# Patient Record
Sex: FEMALE | Race: WHITE
[De-identification: names, ages, dates, MRNs, and addresses within clinical notes are randomized per-mention and may not be internally consistent; named-entity substitution may affect disease eponyms.]

---

## 2019-12-19 ENCOUNTER — HOSPITAL ENCOUNTER (INPATIENT)
Dept: HOSPITAL 76 - ED | Age: 59
LOS: 5 days | Discharge: HOME | DRG: 392 | End: 2019-12-24
Attending: INTERNAL MEDICINE | Admitting: SPECIALIST
Payer: COMMERCIAL

## 2019-12-19 DIAGNOSIS — T36.95XA: ICD-10-CM

## 2019-12-19 DIAGNOSIS — K56.7: ICD-10-CM

## 2019-12-19 DIAGNOSIS — R11.2: ICD-10-CM

## 2019-12-19 DIAGNOSIS — I10: ICD-10-CM

## 2019-12-19 DIAGNOSIS — E66.9: ICD-10-CM

## 2019-12-19 DIAGNOSIS — E87.6: ICD-10-CM
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
## 2019-12-19 DIAGNOSIS — Z87.891: ICD-10-CM

## 2019-12-19 DIAGNOSIS — K57.20: Primary | ICD-10-CM

## 2019-12-19 LAB
ALBUMIN DIAFP-MCNC: 4 G/DL (ref 3.2–5.5)
ALBUMIN/GLOB SERPL: 1.3 {RATIO} (ref 1–2.2)
ALP SERPL-CCNC: 91 IU/L (ref 42–121)
ALT SERPL W P-5'-P-CCNC: 18 IU/L (ref 10–60)
ANION GAP SERPL CALCULATED.4IONS-SCNC: 9 MMOL/L (ref 6–13)
AST SERPL W P-5'-P-CCNC: 22 IU/L (ref 10–42)
BASOPHILS NFR BLD AUTO: 0 10^3/UL (ref 0–0.1)
BASOPHILS NFR BLD AUTO: 0.4 %
BILIRUB BLD-MCNC: 1.3 MG/DL (ref 0.2–1)
BUN SERPL-MCNC: 12 MG/DL (ref 6–20)
CALCIUM UR-MCNC: 9.1 MG/DL (ref 8.5–10.3)
CHLORIDE SERPL-SCNC: 102 MMOL/L (ref 101–111)
CLARITY UR REFRACT.AUTO: CLEAR
CO2 SERPL-SCNC: 28 MMOL/L (ref 21–32)
CREAT SERPLBLD-SCNC: 0.8 MG/DL (ref 0.4–1)
EOSINOPHIL # BLD AUTO: 0.1 10^3/UL (ref 0–0.7)
EOSINOPHIL NFR BLD AUTO: 1 %
ERYTHROCYTE [DISTWIDTH] IN BLOOD BY AUTOMATED COUNT: 13.5 % (ref 12–15)
GFRSERPLBLD MDRD-ARVRAT: 73 ML/MIN/{1.73_M2} (ref 89–?)
GLOBULIN SER-MCNC: 3 G/DL (ref 2.1–4.2)
GLUCOSE SERPL-MCNC: 107 MG/DL (ref 70–100)
GLUCOSE UR QL STRIP.AUTO: NEGATIVE MG/DL
HGB UR QL STRIP: 12.9 G/DL (ref 12–16)
KETONES UR QL STRIP.AUTO: NEGATIVE MG/DL
LIPASE SERPL-CCNC: 25 U/L (ref 22–51)
LYMPHOCYTES # SPEC AUTO: 0.9 10^3/UL (ref 1.5–3.5)
LYMPHOCYTES NFR BLD AUTO: 7.8 %
MCH RBC QN AUTO: 28.3 PG (ref 27–31)
MCHC RBC AUTO-ENTMCNC: 31.9 G/DL (ref 32–36)
MCV RBC AUTO: 88.8 FL (ref 81–99)
MONOCYTES # BLD AUTO: 0.5 10^3/UL (ref 0–1)
MONOCYTES NFR BLD AUTO: 4.8 %
NEUTROPHILS # BLD AUTO: 9.5 10^3/UL (ref 1.5–6.6)
NEUTROPHILS # SNV AUTO: 11.1 X10^3/UL (ref 4.8–10.8)
NEUTROPHILS NFR BLD AUTO: 85.4 %
NITRITE UR QL STRIP.AUTO: NEGATIVE
PDW BLD AUTO: 10.9 FL (ref 7.9–10.8)
PH UR STRIP.AUTO: 6 PH (ref 5–7.5)
PLATELET # BLD: 190 10^3/UL (ref 130–450)
PROT SPEC-MCNC: 7 G/DL (ref 6.7–8.2)
PROT UR STRIP.AUTO-MCNC: NEGATIVE MG/DL
RBC # UR STRIP.AUTO: (no result) /UL
RBC MAR: 4.56 10^6/UL (ref 4.2–5.4)
SODIUM SERPLBLD-SCNC: 139 MMOL/L (ref 135–145)
SP GR UR STRIP.AUTO: 1.01 (ref 1–1.03)
UROBILINOGEN UR QL STRIP.AUTO: (no result) E.U./DL
UROBILINOGEN UR STRIP.AUTO-MCNC: NEGATIVE MG/DL

## 2019-12-19 PROCEDURE — 36415 COLL VENOUS BLD VENIPUNCTURE: CPT

## 2019-12-19 PROCEDURE — 87086 URINE CULTURE/COLONY COUNT: CPT

## 2019-12-19 PROCEDURE — 81003 URINALYSIS AUTO W/O SCOPE: CPT

## 2019-12-19 PROCEDURE — 80053 COMPREHEN METABOLIC PANEL: CPT

## 2019-12-19 PROCEDURE — 96374 THER/PROPH/DIAG INJ IV PUSH: CPT

## 2019-12-19 PROCEDURE — 99285 EMERGENCY DEPT VISIT HI MDM: CPT

## 2019-12-19 PROCEDURE — 83690 ASSAY OF LIPASE: CPT

## 2019-12-19 PROCEDURE — 96361 HYDRATE IV INFUSION ADD-ON: CPT

## 2019-12-19 PROCEDURE — 81001 URINALYSIS AUTO W/SCOPE: CPT

## 2019-12-19 PROCEDURE — 74177 CT ABD & PELVIS W/CONTRAST: CPT

## 2019-12-19 PROCEDURE — 99284 EMERGENCY DEPT VISIT MOD MDM: CPT

## 2019-12-19 PROCEDURE — 80048 BASIC METABOLIC PNL TOTAL CA: CPT

## 2019-12-19 PROCEDURE — 85025 COMPLETE CBC W/AUTO DIFF WBC: CPT

## 2019-12-19 RX ADMIN — ACETAMINOPHEN PRN MG: 325 TABLET ORAL at 19:08

## 2019-12-19 RX ADMIN — SODIUM CHLORIDE SCH MLS/HR: 9 INJECTION, SOLUTION INTRAVENOUS at 19:09

## 2019-12-19 NOTE — ED PHYSICIAN DOCUMENTATION
PD HPI ABD PAIN





- Stated complaint


Stated Complaint: ABD PX





- Chief complaint


Chief Complaint: Abd Pain





- History obtained from


History obtained from: Patient





- History of Present Illness


Timing - onset: How many days ago (1-2)


Timing - duration: Days (1-2)


Timing - details: Gradual onset, Still present


Quality: Cramping (initially), Aching, Sharp (today), Pain


Location: LLQ


Radiation: Lower back


Improved by: Laying still.  No: Eating


Worsened by: Moving.  No: Eating, Breathing


Associated symptoms: No: Fever, Nausea, Vomiting, Diarrhea, Melena


Similar symptoms before: Diagnosis (Had diverticulitis with some bleeding in 

2015 and had clipping of the bleeding vessel.  I believe this was done through 

colonoscopy.)


Recently seen: Not recently seen





Review of Systems


Constitutional: denies: Fever, Myalgias


Nose: denies: Rhinorrhea / runny nose, Congestion


Throat: denies: Sore throat


Respiratory: denies: Cough


GI: reports: Abdominal Pain.  denies: Abdominal Swelling, Vomiting, 

Constipation, Diarrhea


: denies: Dysuria, Frequency


Neurologic: denies: Generalized weakness, Focal weakness, Numbness, Near syncope





PD PAST MEDICAL HISTORY





- Past Medical History


Cardiovascular: None


Respiratory: None


Neuro: None


Endocrine/Autoimmune: None


GI: Diverticulitis (4 years ago)





- Allergies


Allergies/Adverse Reactions: 


                                    Allergies











Allergy/AdvReac Type Severity Reaction Status Date / Time


 


iodine Allergy  Unknown Verified 12/19/19 18:21














PD ED PE NORMAL





- Vitals


Vital signs reviewed: Yes





- General


General: Alert and oriented X 3, Well developed/nourished





- HEENT


HEENT: Moist mucous membranes, Pharynx benign





- Neck


Neck: Supple, no meningeal sign, No adenopathy





- Cardiac


Cardiac: RRR, No murmur





- Respiratory


Respiratory: Clear bilaterally





- Abdomen


Abdomen: Soft, Non distended, No organomegaly, Other (tender LLQ with local 

guarding but no percussion. No rebound nor referred tenderness. )





- Female 


Female : Deferred





- Rectal


Rectal: Deferred





- Back


Back: No CVA TTP





- Derm


Derm: Normal color, Warm and dry





- Extremities


Extremities: Normal ROM s pain





- Neuro


Neuro: Alert and oriented X 3, No motor deficit, Normal speech





Results





- Vitals


Vitals: 


                               Vital Signs - 24 hr











  12/19/19





  13:25


 


Temperature 36.5 C


 


Heart Rate 85


 


Respiratory 16





Rate 


 


Blood Pressure 185/95 H


 


O2 Saturation 98








                                     Oxygen











O2 Source                      Room air

















- Labs


Labs: 


                                Laboratory Tests











  12/19/19 12/19/19 12/19/19





  13:47 13:47 16:00


 


WBC  11.1 H  


 


RBC  4.56  


 


Hgb  12.9  


 


Hct  40.5  


 


MCV  88.8  


 


MCH  28.3  


 


MCHC  31.9 L  


 


RDW  13.5  


 


Plt Count  190  


 


MPV  10.9 H  


 


Neut # (Auto)  9.5 H  


 


Lymph # (Auto)  0.9 L  


 


Mono # (Auto)  0.5  


 


Eos # (Auto)  0.1  


 


Baso # (Auto)  0.0  


 


Absolute Nucleated RBC  0.00  


 


Nucleated RBC %  0.0  


 


Sodium   139 


 


Potassium   3.9 


 


Chloride   102 


 


Carbon Dioxide   28 


 


Anion Gap   9.0 


 


BUN   12 


 


Creatinine   0.8 


 


Estimated GFR (MDRD)   73 L 


 


Glucose   107 H 


 


Calcium   9.1 


 


Total Bilirubin   1.3 H 


 


AST   22 


 


ALT   18 


 


Alkaline Phosphatase   91 


 


Total Protein   7.0 


 


Albumin   4.0 


 


Globulin   3.0 


 


Albumin/Globulin Ratio   1.3 


 


Lipase   25 


 


Urine Color    YELLOW


 


Urine Clarity    CLEAR


 


Urine pH    6.0


 


Ur Specific Gravity    1.010


 


Urine Protein    NEGATIVE


 


Urine Glucose (UA)    NEGATIVE


 


Urine Ketones    NEGATIVE


 


Urine Occult Blood    TRACE-LYSE


 


Urine Nitrite    NEGATIVE


 


Urine Bilirubin    NEGATIVE


 


Urine Urobilinogen    0.2 (NORMAL)


 


Ur Leukocyte Esterase    NEGATIVE


 


Ur Microscopic Review    NOT INDICATED


 


Urine Culture Comments    NOT INDICATED














- Rads (name of study)


  ** abd CT


Radiology: Prelim report reviewed, Discussed with rads (Left lower quadrant 

diverticula with diverticulitis and several small air bubbles under the 

diaphragm.  No abscess nor notable free fluid), See rad report





PD MEDICAL DECISION MAKING





- ED course


Complexity details: reviewed results, considered differential, d/w patient, d/w 

consultant (Dr. Perry, surgery, who was concerned about the microperforation,

and wanted Unasyn/Flagyl and admission to Hospitalist. He will consult and see 

if patient develops need for surgery. Talked with Hospitalist who will place 

patient in the hospital. )





Departure





- Departure


Disposition: [facility] DC/Xfer


Clinical Impression: 


 Acute diverticulitis





Condition: Stable


Record reviewed to determine appropriate education?: Yes


Discharge Date/Time: 12/19/19 18:51

## 2019-12-19 NOTE — HISTORY & PHYSICAL EXAMINATION
Chief Complaint





- Chief Complaint


Chief Complaint: abdominal pain





History of Present Illness





- History of Present Illness


HPI Comment/Other: 


Ms. Vargas is a 59-yrs-old female with a PMH significant for diverticulitis 

with bleeding and clipping of the bleeding vessel in , who present ER 

complain of left middle lower abdominal pain. pt report she had left middle 

lower abdominal pain beginning today morning. she denies nausea, vomiting, 

diarrhea, GI bleeding, fever, chill, chest pain, shortness of breath. CT of 

abdomen reveal acute diverticulitis, and small amounts of free air 

intraperitoneal. Route lab reveals slight elevated WBC. pt is afebrile, 

otherwise she is hemodynamical stable. surgeon was called by ER provider. pt is 

admitted for above medical reason.











History





- Family & Social History


Family History: Mother: , Cancer, Father: Alive and Well


Family History Comment/Other: pt report her mother  from pancreatic cancer. 

her father is still alive with advanced parkinson now.


Social History Notes: pt report she quit smoking about 3-4 yrs, she denies 

acohol and drug issue. she report she is living [place], came to visit her 

family in [facility] Status: Full Code





Meds/Allgy





- Allergies


Allergies/Adverse Reactions: 


                                    Allergies











Allergy/AdvReac Type Severity Reaction Status Date / Time


 


etodolac Allergy  Rash Verified 19 21:34














Review of Systems





- Constitutional


Constitutional: denies: Fatigue, Fever, Chills, Malaise, Weakness, Poor 

appetite, Diaphoresis, Night sweats





- Eyes


Eyes: denies: Pain, Irritation, Blurred vision, Spots in vision, Field loss, 

Vision loss, Dipolpia





- Ears, Nose & Throat


Ears, Nose & Throat: denies: Ear pain, Hearing loss, Tinnitus, Vertigo, Nasal 

pain, Nasal discharge, Nasal obstruction, Postnasal drainage, Sore throat, Mouth

lesions





- Cardiovascular


Cariovascular: denies: Irregular heart rate, Palpitations, Chest pain, Edema, 

Lightheadedness, Syncope, Exertional dyspnea, Decr. exercise tolerance





- Respiratory


Respiratory: denies: Cough, Sputum production, Wheezing, Snoring, Hemoptysis, 

Orthopnea, SOB at rest, SOB with exertion





- Gastrointestinal


Gastrointestinal: reports: Abdominal pain.  denies: Abdominal distention, 

Constipation, Diarrhea, Change in bowel habits, Rectal bleeding, Black stools, 

Bloody stools, Nausea, Vomiting, Bile emesis, Will blood emesis, Coffee grounds

emesis, Reflux/heartburn





- Genitourinary


Genitourinary: denies: Dysuria, Frequency, Urgency, Hematuria, Incontinence, 

Flank pain, Nocturia, Urethral discharge





- Musculoskeletal


Musculoskeletal: denies: Muscle pain, Back pain, Muscle aches, Stiffness, 

Limited range of motion, Muscle weakness, Gout, Joint pain





- Integumentary


Integumentary: denies: Rash, Pruritis, Lesions, Dryness, Lumps, Acne, Pigment 

changes, Nail changes





- Neurological


Neurological: denies: General weakness, Focal weakness, Headache, Dizziness, 

Numbness, Memory problems, Pre-existing deficit, Abnormal gait





- Psychiatric


Psychiatric: denies: Depression, Anxiety, Suicidal, Delusions, Hallucinations, 

Homicidal





- Endocrine


Endocrine: denies: Polyuria, Polydypsia, Polyphagia, Intolerance to cold





- Hematologic/Lymphatic


Hematologic/Lymphatic: denies: Anemia, Bruising, Petechiae, Blood clots, 

Lymphadenopathy, Bleeding tendencies





Exam





- Vital Signs


Reviewed Vital Signs: Yes


Vital Signs: 





                                Vital Signs x48h











  Temp Pulse Resp BP Pulse Ox


 


 19 13:25  36.5 C  85  16  185/95 H  98














- Physical Exam


General Appearance: positive: No acute distress, Alert.  negative: Lethargic


Eyes Bilateral: positive: Normal inspection, PERRL, EOMI.  negative: No lid 

inflammation


ENT: positive: ENT inspection nml, No signs of dehydration.  negative: Purulent 

nasal drainage


Neck: positive: Nml inspection, Thyroid nml, No JVD, Trachea midline.  negative:

Thyromegaly, Lymphadenopathy (R), Lymphadenopathy (L), Stiff neck, Tracheal 

deviation


Respiratory: positive: Chest non-tender, No respiratory distress, Breath sounds 

nml.  negative: Wheezes, Rales, Rhonchi


Cardiovascular: positive: Regular rate & rhythm, No murmur, No gallop.  

negative: Irregularly irregular, Extrasystoles, Tachycardia, Bradycardia, JVD 

present, Systolic murmur, Diastolic murmur


Peripheral Pulses: positive: 2+


Abdomen: positive: Non-tender, No organomegaly, Nml bowel sounds, No distention.

 negative: Tenderness, Guarding, Rebound


Back: positive: Nml inspection.  negative: CVA tenderness (R), CVA tenderness 

(L)


Skin: positive: Color nml, No rash, Warm, Dry.  negative: Cyanosis, Diaphoresis,

Pallor


Extremities: positive: Non-tender, Full ROM, Nml appearance.  negative: Calf 

tenderness, Parrish's sign/cords


Neurologic/Psychiatric: positive: Oriented x3, Motor nml, Sensation nml, 

Mood/affect nml.  negative: Weakness, Sensory loss, Facial droop, Slurred/abnml 

speech





Sepsis Event Note (H)





- Evaluation


Current Stage of Sepsis: Ruled out





Conclusion/Plan





- Problem List


(1) Acute diverticulitis


Conclusion/Plan: 


pt had hx of diverticulitis, CT reveal diverticulitis. pt has lower middle left 

abdominal pain, slight elevated WBC


start Zosyn


IVF


pain control











(2) Free intraperitoneal air


Conclusion/Plan: 


concern pt if has perforation of bowel because free air at intraperitoneal. 


consulted with surgeon


closely monitor pt, vital monitor








(3) HTN (hypertension)


Conclusion/Plan: 


slight elevated BP, which could be caused by abdominal pain. add hydralazine as 

PRN








(4) Obesity (BMI 35.0-39.9 without comorbidity)


Conclusion/Plan: 


advise pt loss of weight








(5) Full code status


Conclusion/Plan: 


pt request full code








- Lab Results


Fish Bones: 


                                 19 04:30





                                 19 04:30





Core Measures





- Anticipated LOS


I expect patient to be DC'd or transferred within 96 hours.: Yes





- DVT/VTE - Prophylaxis


VTE/DVT Device ordered at admit?: Yes


VTE/DVT Prophylaxis med ordered at admit?: Yes

## 2019-12-19 NOTE — CT REPORT
Reason:  left lower abd pain

Procedure Date:  12/19/2019   

Accession Number:  [id number] / [id number]                    

Procedure:  CT  - Abdomen/Pelvis W CPT Code:  

 

***Final Report***

 

 

FULL RESULT:

 

 

EXAM:

CT ABDOMEN AND PELVIS

 

EXAM DATE: 12/19/2019 04:42 PM.

 

CLINICAL HISTORY: Left lower abd pain.

 

COMPARISONS: None.

 

TECHNIQUE: Routine helical CT imaging was performed through the abdomen 

and pelvis. IV contrast: OPTI 320 100ML. Enteric contrast: No. 

Reconstructions: Coronal and sagittal.

 

In accordance with CT protocol optimization, one or more of the following 

dose reduction techniques were utilized for this exam: automated exposure 

control, adjustment of mA and/or KV based on patient size, or use of 

iterative reconstructive technique.

 

FINDINGS:

Lung Bases: Clear lung bases. No effusion. Moderate-sized hiatal hernia.

 

Liver: Mild diffuse hypoattenuation of parenchyma. Otherwise unremarkable.

 

Gallbladder/Bile Ducts: Surgically absent. No ductal dilation.

 

Spleen: Normal.

 

Pancreas: Normal.

 

Adrenal Glands: Normal.

 

Kidneys: Normal. No masses or hydronephrosis.

 

Peritoneal Cavity/Bowel: Marked colonic diverticulosis with localized 

wall thickening and infiltration of fat adjacent to the descending 

sigmoid junction. Small loss of free intraperitoneal air track to the 

diaphragm. Mild dilation of small bowel loops in the left mid to upper 

abdomen, most likely ileus. No free fluid, significant bowel dilation, or 

lymphadenopathy. Normal appendix.

 

Pelvic Organs: Normal. The bladder and visualized pelvic organs are 

within normal limits.

 

Vasculature: No aneurysms or other significant abnormality.

 

Bones: Degenerative changes.

 

Other: Small to moderate-sized bilateral inguinal hernias with fat 

involvement only.

IMPRESSION:

1. Marked diverticulosis with acute diverticulitis.

2. Small amounts of free intraperitoneal air most likely originating from 

the diverticulitis.

3. Moderate-sized hiatal hernia, fatty liver, and other chronic or 

incidental findings.

 

RADIA

 

The critical result notification system was initiated by Dr. Amos Fermin at 05:07 PM on 12/19/2019.

 

The above critical result findings  were discussed with [employer] [place] by 

Dr. Amos Fermin at 05:11 PM on 12/19/2019.

## 2019-12-20 LAB
ANION GAP SERPL CALCULATED.4IONS-SCNC: 8 MMOL/L (ref 6–13)
BASOPHILS NFR BLD AUTO: 0 10^3/UL (ref 0–0.1)
BASOPHILS NFR BLD AUTO: 0.4 %
BUN SERPL-MCNC: 9 MG/DL (ref 6–20)
CALCIUM UR-MCNC: 8.4 MG/DL (ref 8.5–10.3)
CHLORIDE SERPL-SCNC: 105 MMOL/L (ref 101–111)
CO2 SERPL-SCNC: 27 MMOL/L (ref 21–32)
CREAT SERPLBLD-SCNC: 0.7 MG/DL (ref 0.4–1)
EOSINOPHIL # BLD AUTO: 0.1 10^3/UL (ref 0–0.7)
EOSINOPHIL NFR BLD AUTO: 1.2 %
ERYTHROCYTE [DISTWIDTH] IN BLOOD BY AUTOMATED COUNT: 13.4 % (ref 12–15)
GFRSERPLBLD MDRD-ARVRAT: 85 ML/MIN/{1.73_M2} (ref 89–?)
GLUCOSE SERPL-MCNC: 101 MG/DL (ref 70–100)
HGB UR QL STRIP: 11.5 G/DL (ref 12–16)
LYMPHOCYTES # SPEC AUTO: 0.9 10^3/UL (ref 1.5–3.5)
LYMPHOCYTES NFR BLD AUTO: 10.4 %
MCH RBC QN AUTO: 29.1 PG (ref 27–31)
MCHC RBC AUTO-ENTMCNC: 31.9 G/DL (ref 32–36)
MCV RBC AUTO: 91.1 FL (ref 81–99)
MONOCYTES # BLD AUTO: 0.5 10^3/UL (ref 0–1)
MONOCYTES NFR BLD AUTO: 5.8 %
NEUTROPHILS # BLD AUTO: 7 10^3/UL (ref 1.5–6.6)
NEUTROPHILS # SNV AUTO: 8.6 X10^3/UL (ref 4.8–10.8)
NEUTROPHILS NFR BLD AUTO: 81.6 %
PDW BLD AUTO: 11.1 FL (ref 7.9–10.8)
PLATELET # BLD: 154 10^3/UL (ref 130–450)
RBC MAR: 3.95 10^6/UL (ref 4.2–5.4)
SODIUM SERPLBLD-SCNC: 140 MMOL/L (ref 135–145)

## 2019-12-20 RX ADMIN — ACETAMINOPHEN PRN MG: 325 TABLET ORAL at 13:04

## 2019-12-20 RX ADMIN — ENOXAPARIN SODIUM SCH: 100 INJECTION SUBCUTANEOUS at 09:58

## 2019-12-20 RX ADMIN — SODIUM CHLORIDE, PRESERVATIVE FREE SCH: 5 INJECTION INTRAVENOUS at 17:01

## 2019-12-20 RX ADMIN — SODIUM CHLORIDE SCH MLS/HR: 9 INJECTION, SOLUTION INTRAVENOUS at 00:30

## 2019-12-20 RX ADMIN — ACETAMINOPHEN PRN MG: 325 TABLET ORAL at 00:30

## 2019-12-20 RX ADMIN — POTASSIUM CHLORIDE SCH MLS/HR: 7.46 INJECTION, SOLUTION INTRAVENOUS at 09:58

## 2019-12-20 RX ADMIN — POTASSIUM CHLORIDE SCH MLS/HR: 7.46 INJECTION, SOLUTION INTRAVENOUS at 07:48

## 2019-12-20 RX ADMIN — SODIUM CHLORIDE, PRESERVATIVE FREE SCH: 5 INJECTION INTRAVENOUS at 09:58

## 2019-12-20 RX ADMIN — SODIUM CHLORIDE SCH MLS/HR: 9 INJECTION, SOLUTION INTRAVENOUS at 07:49

## 2019-12-20 RX ADMIN — POTASSIUM CHLORIDE, DEXTROSE MONOHYDRATE AND SODIUM CHLORIDE SCH MLS/HR: 150; 5; 450 INJECTION, SOLUTION INTRAVENOUS at 13:05

## 2019-12-20 RX ADMIN — SODIUM CHLORIDE SCH MLS/HR: 9 INJECTION, SOLUTION INTRAVENOUS at 06:43

## 2019-12-20 RX ADMIN — SODIUM CHLORIDE SCH MLS/HR: 9 INJECTION, SOLUTION INTRAVENOUS at 11:56

## 2019-12-20 RX ADMIN — POTASSIUM CHLORIDE, DEXTROSE MONOHYDRATE AND SODIUM CHLORIDE SCH MLS/HR: 150; 5; 450 INJECTION, SOLUTION INTRAVENOUS at 22:47

## 2019-12-20 RX ADMIN — SODIUM CHLORIDE, PRESERVATIVE FREE SCH: 5 INJECTION INTRAVENOUS at 00:31

## 2019-12-20 RX ADMIN — SODIUM CHLORIDE SCH MLS/HR: 9 INJECTION, SOLUTION INTRAVENOUS at 17:32

## 2019-12-20 RX ADMIN — ACETAMINOPHEN PRN MG: 325 TABLET ORAL at 18:24

## 2019-12-20 RX ADMIN — POTASSIUM CHLORIDE SCH MLS/HR: 7.46 INJECTION, SOLUTION INTRAVENOUS at 08:58

## 2019-12-20 RX ADMIN — SODIUM CHLORIDE SCH MLS/HR: 9 INJECTION, SOLUTION INTRAVENOUS at 05:49

## 2019-12-20 RX ADMIN — POTASSIUM CHLORIDE SCH MLS/HR: 7.46 INJECTION, SOLUTION INTRAVENOUS at 06:43

## 2019-12-20 RX ADMIN — ACETAMINOPHEN PRN MG: 325 TABLET ORAL at 06:52

## 2019-12-20 NOTE — PROVIDER PROGRESS NOTE
Assessment/Plan





- Problem List


(1) Acute diverticulitis


Assessment/Plan: 





12/20 pt report she feel better, abdominal pain is better. she denies fever, 

chill, N/V/D. WBC is running down to normal now.


per surgeon, pt is still on NPO and bowel rest now


continue antibiotics and pain contro


continue D5 1/2 NS with 20 meq of Potassium


continue lab and vital monitor


 


pt had hx of diverticulitis, CT reveal diverticulitis. pt has lower middle left 

abdominal pain, slight elevated WBC


start Zosyn


IVF


pain control





(2)hypokalemia


pt has potassium 3.2 today. replacement of potassium, lab monitor





(3) Free intraperitoneal air


Conclusion/Plan: 


12/20, pt report she feel better, abdominal pain is good controlled, WBC is 

down.


followup surgeon


continue antibiotics, pain control, IVF





concern pt if has perforation of bowel because free air at intraperitoneal. 


consulted with surgeon


closely monitor pt, vital monitor








(4) HTN (hypertension)


Conclusion/Plan: 


12/20 stable.





slight elevated BP, which could be caused by abdominal pain. add hydralazine as 

PRN








(5) Obesity (BMI 35.0-39.9 without comorbidity)


Conclusion/Plan: 


advise pt loss of weight























- Current Meds


Current Meds: 





                               Current Medications











Generic Name Dose Route Start Last Admin





  Trade Name Freq  PRN Reason Stop Dose Admin


 


Acetaminophen  650 mg  12/19/19 18:11  12/20/19 06:52





  Tylenol  PO   650 mg





  Q4HR PRN   Administration





  Pain or Fever > 38C (100.4F)   





     





     





     


 


Enoxaparin Sodium  40 mg  12/20/19 09:00  12/20/19 09:58





  Lovenox  SUBQ   Not Given





  DAILY SAUD   





     





     





     





     


 


Piperacillin Sod/Tazobactam  100 mls @ 200 mls/hr  12/20/19 00:00  12/20/19 

12:34





  Sod 3.375 gm/ Sodium Chloride  IV   Infused





  Q6H SAUD   Infusion





     





     





     





     


 


Sodium Chloride  10 ml  12/20/19 01:00  12/20/19 09:58





  Normal Saline Flush 0.9%  IVP   Not Given





  0100,0900,1700 SAUD   





     





     





     





     














- Lab Result


Fish Bone Diagrams: 


                                 12/20/19 04:30





                                 12/20/19 04:30





- Additional Planning


My Orders: 





My Active Orders





12/19/19 18:11


Acetaminophen [Tylenol]   650 mg PO Q4HR PRN 





12/19/19 18:14


hydrALAZINE INJ [Apresoline Inj]   10 mg IVP QID PRN 





12/20/19 09:00


Enoxaparin [Lovenox]   40 mg SUBQ DAILY 





12/20/19 13:00


D5.45ns W/20 Meq KCl 1,000 ml  mls/hr 














Subjective





- Subjective


Patient Reports: Feeling Better





Objective


Vital Signs: 





                               Vital Signs - 24 hr











  12/19/19 12/19/19 12/19/19





  13:25 18:29 19:25


 


Temperature 36.5 C  36.7 C


 


Heart Rate 85 78 


 


Heart Rate [   81





Monitoring   





electrodes]   


 


Respiratory 16 18 19





Rate   


 


Blood Pressure 185/95 H 144/88 H 


 


Blood Pressure   160/100 H





[Right Brachial   





artery]   


 


O2 Saturation 98 98 97














  12/19/19 12/20/19 12/20/19





  22:15 00:00 04:06


 


Temperature  36.9 C 36.9 C


 


Heart Rate   88


 


Heart Rate [  88 





Monitoring   





electrodes]   


 


Respiratory  18 18





Rate   


 


Blood Pressure   


 


Blood Pressure 150/98 H 159/54 H 





[Right Brachial   





artery]   


 


O2 Saturation  98 98














  12/20/19





  08:00


 


Temperature 37.3 C


 


Heart Rate 


 


Heart Rate [ 82





Monitoring 





electrodes] 


 


Respiratory 20





Rate 


 


Blood Pressure 


 


Blood Pressure 135/80 H





[Right Brachial 





artery] 


 


O2 Saturation 96








                                     Oxygen











O2 Source                      Room air














I&O (Last 24 Hrs): 





                          Intake and Output Totals x24h











 12/18/19 12/19/19 12/20/19





 23:59 23:59 23:59


 


Intake Total  6477.392 6664.333


 


Balance  4958.106 7470.333











General: Alert, Oriented x3, No acute distress


HEENT: Atraumatic


Neck: Supple


Lymphatic: no adenopathy


Neuro: Alert, Non Focal, Oriented Times 3


Cardiovascular: Regular rate, Normal S1, Normal S2


Respiratory: Chest non-tender, No respiratory distress, Breath sounds nml


Abdomen: Normal bowel sounds, Soft, No tenderness


Extremities: No edema, Normal pulses





- Results


Results: 





                               Laboratory Results











WBC  8.6 x10^3/uL (4.8-10.8)   12/20/19  04:30    


 


RBC  3.95 10^6/uL (4.20-5.40)  L  12/20/19  04:30    


 


Hgb  11.5 g/dL (12.0-16.0)  L  12/20/19  04:30    


 


Hct  36.0 % (37.0-47.0)  L  12/20/19  04:30    


 


MCV  91.1 fL (81.0-99.0)   12/20/19  04:30    


 


MCH  29.1 pg (27.0-31.0)   12/20/19  04:30    


 


MCHC  31.9 g/dL (32.0-36.0)  L  12/20/19  04:30    


 


RDW  13.4 % (12.0-15.0)   12/20/19  04:30    


 


Plt Count  154 10^3/uL (130-450)   12/20/19  04:30    


 


MPV  11.1 fL (7.9-10.8)  H  12/20/19  04:30    


 


Neut # (Auto)  7.0 10^3/uL (1.5-6.6)  H  12/20/19  04:30    


 


Lymph # (Auto)  0.9 10^3/uL (1.5-3.5)  L  12/20/19  04:30    


 


Mono # (Auto)  0.5 10^3/uL (0.0-1.0)   12/20/19  04:30    


 


Eos # (Auto)  0.1 10^3/uL (0.0-0.7)   12/20/19  04:30    


 


Baso # (Auto)  0.0 10^3/uL (0.0-0.1)   12/20/19  04:30    


 


Absolute Nucleated RBC  0.00 x10^3/uL  12/20/19  04:30    


 


Nucleated RBC %  0.0 /100WBC  12/20/19  04:30    


 


Sodium  140 mmol/L (135-145)   12/20/19  04:30    


 


Potassium  3.2 mmol/L (3.5-5.0)  L  12/20/19  04:30    


 


Chloride  105 mmol/L (101-111)   12/20/19  04:30    


 


Carbon Dioxide  27 mmol/L (21-32)   12/20/19  04:30    


 


Anion Gap  8.0  (6-13)   12/20/19  04:30    


 


BUN  9 mg/dL (6-20)   12/20/19  04:30    


 


Creatinine  0.7 mg/dL (0.4-1.0)   12/20/19  04:30    


 


Estimated GFR (MDRD)  85  (>89)  L  12/20/19  04:30    


 


Glucose  101 mg/dL ()  H  12/20/19  04:30    


 


Calcium  8.4 mg/dL (8.5-10.3)  L  12/20/19  04:30    


 


Total Bilirubin  1.3 mg/dL (0.2-1.0)  H  12/19/19  13:47    


 


AST  22 IU/L (10-42)   12/19/19  13:47    


 


ALT  18 IU/L (10-60)   12/19/19  13:47    


 


Alkaline Phosphatase  91 IU/L ()   12/19/19  13:47    


 


Total Protein  7.0 g/dL (6.7-8.2)   12/19/19  13:47    


 


Albumin  4.0 g/dL (3.2-5.5)   12/19/19  13:47    


 


Globulin  3.0 g/dL (2.1-4.2)   12/19/19  13:47    


 


Albumin/Globulin Ratio  1.3  (1.0-2.2)   12/19/19  13:47    


 


Lipase  25 U/L (22-51)   12/19/19  13:47    


 


Urine Color  YELLOW   12/19/19  16:00    


 


Urine Clarity  CLEAR  (CLEAR)   12/19/19  16:00    


 


Urine pH  6.0 PH (5.0-7.5)   12/19/19  16:00    


 


Ur Specific Gravity  1.010  (1.002-1.030)   12/19/19  16:00    


 


Urine Protein  NEGATIVE mg/dL (NEGATIVE)   12/19/19  16:00    


 


Urine Glucose (UA)  NEGATIVE mg/dL (NEGATIVE)   12/19/19  16:00    


 


Urine Ketones  NEGATIVE mg/dL (NEGATIVE)   12/19/19  16:00    


 


Urine Occult Blood  TRACE-LYSE  (NEGATIVE)   12/19/19  16:00    


 


Urine Nitrite  NEGATIVE  (NEGATIVE)   12/19/19  16:00    


 


Urine Bilirubin  NEGATIVE  (NEGATIVE)   12/19/19  16:00    


 


Urine Urobilinogen  0.2 (NORMAL) E.U./dL (NORMAL)   12/19/19  16:00    


 


Ur Leukocyte Esterase  NEGATIVE  (NEGATIVE)   12/19/19  16:00    


 


Ur Microscopic Review  NOT INDICATED   12/19/19  16:00    


 


Urine Culture Comments  NOT INDICATED   12/19/19  16:00    














Sepsis Event Note (H)





- Evaluation


Current Stage of Sepsis: Ruled out





ABX Reporting


Has patient been on IV antibiotics over the past 48 hours?: Yes





Current Medications





- Current Medications


Current Medications: 








Active Medications





Acetaminophen (Tylenol)  650 mg PO Q4HR PRN


   PRN Reason: Pain or Fever > 38C (100.4F)


   Last Admin: 12/20/19 06:52 Dose:  650 mg


Enoxaparin Sodium (Lovenox)  40 mg SUBQ DAILY [facility]


   Last Admin: 12/20/19 09:58 Dose:  Not Given


Hydralazine HCl (Apresoline Inj)  10 mg IVP QID PRN


   PRN Reason: Hypertensive Emergency


Piperacillin Sod/Tazobactam (Sod 3.375 gm/ Sodium Chloride)  100 mls @ 200 

mls/hr IV Q6H [facility]


   Last Infusion: 12/20/19 12:34 Dose:  Infused


Potassium Chloride/Dextrose/Sod Cl (D5.45ns W/20 Meq Kcl)  1,000 mls @ 100 

mls/hr IV .Q10H [facility]


Morphine Sulfate (Morphine (Carpuject))  2 mg IVP Q2HR PRN


   PRN Reason: Pain 8 to 10


Ondansetron HCl (Zofran Inj)  4 mg IVP Q6HR PRN


   PRN Reason: Nausea / Vomiting


Ondansetron HCl (Zofran Odt)  4 mg TL Q6HR PRN


   PRN Reason: Nausea / Vomiting


Prochlorperazine Edisylate (Compazine Inj)  10 mg IVP Q6HR PRN


   PRN Reason: Nausea / Vomiting


Sodium Chloride (Normal Saline Flush 0.9%)  10 ml IVP PRN PRN


   PRN Reason: AS NEEDED PER PROVIDER ORDERS


Sodium Chloride (Normal Saline Flush 0.9%)  10 ml IVP 0100,0900,1700 [facility]


   Last Admin: 12/20/19 09:58 Dose:  Not Given





                                        





No Known Home Medications  12/20/19

## 2019-12-20 NOTE — PHARMACY PROGRESS NOTE
- Best Possible Medication History


Admit Date and Time: 12/19/19 1750


Processed by: Pharmacy


Medication History completed: Yes


Patient Interview: Pt interview ONLY source





As the person ultimately responsible for medication therapy, providers are able 

to order a medication from an existing home medication list in [facility] via the 

"Reconcile Routine" prior to Confirmation of that medication by support staff. 

Such practice is discouraged except when the physician, in their clinical 

judgment, deems that a medical need exists for a medication without regard to 

previous use.

## 2019-12-20 NOTE — CONSULTATION NOTE
Referring Provider


Name of Referring Provider:: Richy





Chief Complaint





- Chief Complaint


Chief Complaint: LLQ pain





History of Present Illness





- History of Present Illness


HPI Comment/Other: 





Timing - onset: How many days ago (1-2)


Timing - duration: Days (1-2)


Timing - details: Gradual onset, Still present


Quality: Cramping (initially), Aching, Sharp (today), Pain


Location: LLQ


Radiation: Lower back


Improved by: Laying still.  No: Eating


Worsened by: Moving.  No: Eating, Breathing


Associated symptoms: No: Fever, Nausea, Vomiting, Diarrhea, Melena


Similar symptoms before: Diagnosis (Had diverticulitis with some bleeding in 

2015 and had clipping of the bleeding vessel.  I believe this was done through 

colonoscopy.)





History





- Past Medical History


Cardiovascular: reports: None (Underwent colonoscopic clipping of diverticular 

bleed in the past)


Respiratory: reports: None


Neuro: reports: None


Endocrine/Autoimmune: reports: None


GI: reports: Diverticulitis





Meds/Allgy





- Allergies


Allergies/Adverse Reactions: 


                                    Allergies











Allergy/AdvReac Type Severity Reaction Status Date / Time


 


etodolac Allergy  Rash Verified 12/19/19 21:34














Review of Systems





- Gastrointestinal


Gastrointestinal: reports: Abdominal pain (LLQ pain. Better somewhat this am)





Exam





- Vital Signs


Reviewed Vital Signs: Yes


Vital Signs: 





                                Vital Signs x48h











  Temp Pulse Pulse Resp BP Pulse Ox


 


 12/20/19 04:06  36.9 C  88   18   98


 


 12/20/19 00:00  36.9 C   88  18  159/54 H  98














- Physical Exam


General Appearance: positive: No acute distress


Eyes Bilateral: positive: Normal inspection


Neck: positive: Nml inspection


Respiratory: positive: No respiratory distress


Abdomen: positive: Tenderness (Moderate tenderness LLQ. No rebound)





Conclusion and Plan





- Lab Results





Laboratory Results





12/20/19 04:30: Sodium 140, Potassium 3.2 L, Chloride 105, Carbon Dioxide 27, 

Anion Gap 8.0, BUN 9, Creatinine 0.7, Estimated GFR (MDRD) 85 L, Glucose 101 H, 

Calcium 8.4 L


12/20/19 04:30: WBC 8.6, RBC 3.95 L, Hgb 11.5 L, Hct 36.0 L, MCV 91.1, MCH 29.1,

MCHC 31.9 L, RDW 13.4, Plt Count 154, MPV 11.1 H, Neut # (Auto) 7.0 H, Lymph # 

(Auto) 0.9 L, Mono # (Auto) 0.5, Eos # (Auto) 0.1, Baso # (Auto) 0.0, Absolute 

Nucleated RBC 0.00, Nucleated RBC % 0.0


12/19/19 16:00: Urine Color YELLOW, Urine Clarity CLEAR, Urine pH 6.0, Ur 

Specific Gravity 1.010, Urine Protein NEGATIVE, Urine Glucose (UA) NEGATIVE, 

Urine Ketones NEGATIVE, Urine Occult Blood TRACE-LYSE, Urine Nitrite NEGATIVE, 

Urine Bilirubin NEGATIVE, Urine Urobilinogen 0.2 (NORMAL), Ur Leukocyte Esterase

NEGATIVE, Ur Microscopic Review NOT INDICATED, Urine Culture Comments NOT 

INDICATED


12/19/19 13:47: Sodium 139, Potassium 3.9, Chloride 102, Carbon Dioxide 28, 

Anion Gap 9.0, BUN 12, Creatinine 0.8, Estimated GFR (MDRD) 73 L, Glucose 107 H,

Calcium 9.1, Total Bilirubin 1.3 H, AST 22, ALT 18, Alkaline Phosphatase 91, 

Total Protein 7.0, Albumin 4.0, Globulin 3.0, Albumin/Globulin Ratio 1.3, Lipase

25


12/19/19 13:47: WBC 11.1 H, RBC 4.56, Hgb 12.9, Hct 40.5, MCV 88.8, MCH 28.3, 

MCHC 31.9 L, RDW 13.5, Plt Count 190, MPV 10.9 H, Neut # (Auto) 9.5 H, Lymph # 

(Auto) 0.9 L, Mono # (Auto) 0.5, Eos # (Auto) 0.1, Baso # (Auto) 0.0, Absolute 

Nucleated RBC 0.00, Nucleated RBC % 0.0











- Diagnosis


Diagnosis: Acute diverticulitis w/microperforation





- Plan


Plan: 





Cont IV ABX / Bowel rest


May need elective sigmoidectomy in the future

## 2019-12-21 LAB
ALBUMIN DIAFP-MCNC: 3.4 G/DL (ref 3.2–5.5)
ALBUMIN/GLOB SERPL: 1.2 {RATIO} (ref 1–2.2)
ALP SERPL-CCNC: 90 IU/L (ref 42–121)
ALT SERPL W P-5'-P-CCNC: 13 IU/L (ref 10–60)
ANION GAP SERPL CALCULATED.4IONS-SCNC: 8 MMOL/L (ref 6–13)
ANION GAP SERPL CALCULATED.4IONS-SCNC: 8 MMOL/L (ref 6–13)
AST SERPL W P-5'-P-CCNC: 16 IU/L (ref 10–42)
BASOPHILS NFR BLD AUTO: 0 10^3/UL (ref 0–0.1)
BASOPHILS NFR BLD AUTO: 0.3 %
BILIRUB BLD-MCNC: 0.8 MG/DL (ref 0.2–1)
BUN SERPL-MCNC: 5 MG/DL (ref 6–20)
BUN SERPL-MCNC: 6 MG/DL (ref 6–20)
CALCIUM UR-MCNC: 8.4 MG/DL (ref 8.5–10.3)
CALCIUM UR-MCNC: 8.9 MG/DL (ref 8.5–10.3)
CHLORIDE SERPL-SCNC: 105 MMOL/L (ref 101–111)
CHLORIDE SERPL-SCNC: 107 MMOL/L (ref 101–111)
CO2 SERPL-SCNC: 25 MMOL/L (ref 21–32)
CO2 SERPL-SCNC: 28 MMOL/L (ref 21–32)
CREAT SERPLBLD-SCNC: 0.5 MG/DL (ref 0.4–1)
CREAT SERPLBLD-SCNC: 0.7 MG/DL (ref 0.4–1)
EOSINOPHIL # BLD AUTO: 0.2 10^3/UL (ref 0–0.7)
EOSINOPHIL NFR BLD AUTO: 3.5 %
ERYTHROCYTE [DISTWIDTH] IN BLOOD BY AUTOMATED COUNT: 13.3 % (ref 12–15)
GFRSERPLBLD MDRD-ARVRAT: 125 ML/MIN/{1.73_M2} (ref 89–?)
GFRSERPLBLD MDRD-ARVRAT: 85 ML/MIN/{1.73_M2} (ref 89–?)
GLOBULIN SER-MCNC: 2.9 G/DL (ref 2.1–4.2)
GLUCOSE SERPL-MCNC: 103 MG/DL (ref 70–100)
GLUCOSE SERPL-MCNC: 147 MG/DL (ref 70–100)
HGB UR QL STRIP: 11.5 G/DL (ref 12–16)
LYMPHOCYTES # SPEC AUTO: 1.1 10^3/UL (ref 1.5–3.5)
LYMPHOCYTES NFR BLD AUTO: 15.8 %
MCH RBC QN AUTO: 28.5 PG (ref 27–31)
MCHC RBC AUTO-ENTMCNC: 31.3 G/DL (ref 32–36)
MCV RBC AUTO: 90.8 FL (ref 81–99)
MONOCYTES # BLD AUTO: 0.5 10^3/UL (ref 0–1)
MONOCYTES NFR BLD AUTO: 6.7 %
NEUTROPHILS # BLD AUTO: 5 10^3/UL (ref 1.5–6.6)
NEUTROPHILS # SNV AUTO: 6.8 X10^3/UL (ref 4.8–10.8)
NEUTROPHILS NFR BLD AUTO: 73.3 %
PDW BLD AUTO: 10.9 FL (ref 7.9–10.8)
PLATELET # BLD: 168 10^3/UL (ref 130–450)
PROT SPEC-MCNC: 6.3 G/DL (ref 6.7–8.2)
RBC MAR: 4.04 10^6/UL (ref 4.2–5.4)
SODIUM SERPLBLD-SCNC: 138 MMOL/L (ref 135–145)
SODIUM SERPLBLD-SCNC: 143 MMOL/L (ref 135–145)

## 2019-12-21 RX ADMIN — SODIUM CHLORIDE SCH MLS/HR: 9 INJECTION, SOLUTION INTRAVENOUS at 23:58

## 2019-12-21 RX ADMIN — SODIUM CHLORIDE SCH MLS/HR: 9 INJECTION, SOLUTION INTRAVENOUS at 18:28

## 2019-12-21 RX ADMIN — ENOXAPARIN SODIUM SCH: 100 INJECTION SUBCUTANEOUS at 10:42

## 2019-12-21 RX ADMIN — ACETAMINOPHEN PRN MG: 325 TABLET ORAL at 00:12

## 2019-12-21 RX ADMIN — SODIUM CHLORIDE, PRESERVATIVE FREE SCH: 5 INJECTION INTRAVENOUS at 10:42

## 2019-12-21 RX ADMIN — KETOROLAC TROMETHAMINE PRN MG: 30 INJECTION, SOLUTION INTRAMUSCULAR at 16:08

## 2019-12-21 RX ADMIN — ONDANSETRON PRN MG: 2 INJECTION INTRAMUSCULAR; INTRAVENOUS at 12:05

## 2019-12-21 RX ADMIN — SODIUM CHLORIDE, PRESERVATIVE FREE SCH ML: 5 INJECTION INTRAVENOUS at 16:20

## 2019-12-21 RX ADMIN — SODIUM CHLORIDE SCH MLS/HR: 9 INJECTION, SOLUTION INTRAVENOUS at 06:03

## 2019-12-21 RX ADMIN — MORPHINE SULFATE PRN MG: 2 INJECTION, SOLUTION INTRAMUSCULAR; INTRAVENOUS at 08:48

## 2019-12-21 RX ADMIN — SODIUM CHLORIDE, PRESERVATIVE FREE SCH ML: 5 INJECTION INTRAVENOUS at 00:16

## 2019-12-21 RX ADMIN — ACETAMINOPHEN PRN MG: 325 TABLET ORAL at 06:04

## 2019-12-21 RX ADMIN — SODIUM CHLORIDE SCH MLS/HR: 9 INJECTION, SOLUTION INTRAVENOUS at 00:13

## 2019-12-21 RX ADMIN — MORPHINE SULFATE PRN MG: 2 INJECTION, SOLUTION INTRAMUSCULAR; INTRAVENOUS at 12:01

## 2019-12-21 RX ADMIN — POTASSIUM CHLORIDE, DEXTROSE MONOHYDRATE AND SODIUM CHLORIDE SCH MLS/HR: 150; 5; 450 INJECTION, SOLUTION INTRAVENOUS at 11:12

## 2019-12-21 RX ADMIN — SODIUM CHLORIDE SCH MLS/HR: 9 INJECTION, SOLUTION INTRAVENOUS at 11:55

## 2019-12-21 RX ADMIN — POTASSIUM CHLORIDE, DEXTROSE MONOHYDRATE AND SODIUM CHLORIDE SCH MLS/HR: 150; 5; 450 INJECTION, SOLUTION INTRAVENOUS at 23:58

## 2019-12-21 RX ADMIN — KETOROLAC TROMETHAMINE PRN MG: 30 INJECTION, SOLUTION INTRAMUSCULAR at 21:52

## 2019-12-21 NOTE — CT REPORT
Reason:  acute abdominal pain

Procedure Date:  12/21/2019   

Accession Number:  [id number] / [id number]                    

Procedure:  CT  - Abdomen/Pelvis W CPT Code:  

 

***Final Report***

 

 

FULL RESULT:

 

 

EXAM:

CT ABDOMEN AND PELVIS

 

EXAM DATE: 12/21/2019 09:47 AM.

 

CLINICAL HISTORY: Acute abdominal pain.

 

COMPARISONS: ABDOMEN/PELVIS W/ 12/19/2019 4:36 PM images and report from 

[facility].

 

TECHNIQUE: Routine helical CT imaging was performed through the abdomen 

and pelvis. IV contrast: OPTI 320 100 mL. Enteric contrast: No. 

Reconstructions: Coronal and sagittal.

 

In accordance with CT protocol optimization, one or more of the following 

dose reduction techniques were utilized for this exam: automated exposure 

control, adjustment of mA and/or KV based on patient size, or use of 

iterative reconstructive technique.

 

FINDINGS:

Lung Bases: Unremarkable.

 

Liver: Normal. No masses.

 

Gallbladder/Bile Ducts: Gallbladder has been removed. No intrahepatic 

bile duct distention.

 

Spleen: Normal.

 

Pancreas: Normal.

 

Adrenal Glands: Normal.

 

Kidneys: Normal. No masses or hydronephrosis.

 

Peritoneal Cavity/Bowel: As of the previous examination, there is some 

inflammation surrounding the left lower quadrant sigmoid colon. Extensive 

diverticulosis is present. A small adjacent rim of fluid is seen in the 

midportion of the sigmoid colon which is more prominent now than on the 

previous exam. Series 3 image 64. Also please note that at least 2 

adjacent loops of small bowel are also thickened and inflamed and there 

is a significant amount of surrounding mesenteric inflammatory change. 

These features also show progression since the previous study. The 

appendix is well visualized and normal.

 

Pelvic Organs: Normal. The bladder and visualized pelvic organs are 

within normal limits.

 

Vasculature: No aneurysms or other significant abnormality.

 

Bones: No significant abnormality.

 

Other: Large hiatus hernia.

IMPRESSION:

1. As on the previous examination, left lower quadrant inflammation 

surrounds the sigmoid colon. There has been progression since the 

comparison study. Although no discrete focal abscesses are noted, there 

is a thin rim of fluid which is adjacent to the sigmoid colon which is 

larger than before. Also please note that at least 2 loops of small bowel 

adjacent to the colon are thickened. Some mesenteric thickening and 

inflammatory changes are also noted.

 

RADIA

## 2019-12-21 NOTE — PROVIDER PROGRESS NOTE
Assessment/Plan





- Problem List


(1) Acute diverticulitis


Assessment/Plan: 





12/21. pt present localization pain on her left middle abdomen after she took 

meds, she also felt full on left middle abdomen. Pt did not have fever, WBC is 

continuing down to normal. Re-visit to pt, pt is comfortable sleeping.


Immediate order CT with contrast on STAT. CT result reveals progression 

comparison with the previous study, there is a thin rim of fluid which is 

adjacent to the sigmoid colon is larger than before, at least 2 loops of small 

bowel adjacent to the colon are thickened, some mesenteric thickening and inflam

matory changes. 


called surgeon to report the new CT of abdomen result, Per discussion with 

surgeon, add Flagyl, start clear diet, pain control. Pt's WBC is normal, pt has 

no fever, no N/V/D.


closely vital and lab monitor





12/20 pt report she feel better, abdominal pain is better. she denies fever, 

chill, N/V/D. WBC is running down to normal now.


per surgeon, pt is still on NPO and bowel rest now


continue antibiotics and pain contro


continue D5 1/2 NS with 20 meq of Potassium


continue lab and vital monitor


 


pt had hx of diverticulitis, CT reveal diverticulitis. pt has lower middle left 

abdominal pain, slight elevated WBC


start Zosyn


IVF


pain control





(2)hypokalemia


12/21 K ia 3.4, and replacement of potassium, and lab monitor





pt has potassium 3.2 today. replacement of potassium, lab monitor





(3) Free intraperitoneal air


Conclusion/Plan: 


12/21 new CT did not reveal free air intraperitoneal, pt's WBC is continuing to 

run down, pt has no fever or chill. discussed surgeon, add above management. 





12/20, pt report she feel better, abdominal pain is good controlled, WBC is 

down.


followup surgeon


continue antibiotics, pain control, IVF





concern pt if has perforation of bowel because free air at intraperitoneal. 


consulted with surgeon


closely monitor pt, vital monitor








(4) HTN (hypertension)


Conclusion/Plan: 


12/20 stable.





slight elevated BP, which could be caused by abdominal pain. add hydralazine as 

PRN








(5) Obesity (BMI 35.0-39.9 without comorbidity)


Conclusion/Plan: 


advise pt loss of weight




















- Current Meds


Current Meds: 





                               Current Medications











Generic Name Dose Route Start Last Admin





  Trade Name Freq  PRN Reason Stop Dose Admin


 


Acetaminophen  650 mg  12/19/19 18:11  12/21/19 06:04





  Tylenol  PO   650 mg





  Q4HR PRN   Administration





  Pain or Fever > 38C (100.4F)   





     





     





     


 


Enoxaparin Sodium  40 mg  12/20/19 09:00  12/21/19 10:42





  Lovenox  SUBQ   Not Given





  DAILY SAUD   





     





     





     





     


 


Piperacillin Sod/Tazobactam  100 mls @ 200 mls/hr  12/20/19 00:00  12/21/19 12:3

9





  Sod 3.375 gm/ Sodium Chloride  IV   Infused





  Q6H SAUD   Infusion





     





     





     





     


 


Potassium Chloride/Dextrose/Sod Cl  1,000 mls @ 100 mls/hr  12/20/19 13:00  

12/21/19 11:12





  D5.45ns W/20 Meq Kcl  IV   100 mls/hr





  .Q10H SAUD   Administration





     





     





     





     


 


Morphine Sulfate  2 mg  12/19/19 17:50  12/21/19 12:01





  Morphine (Carpuject)  IVP   2 mg





  Q2HR PRN   Administration





  Pain 8 to 10   





     





     





     


 


Ondansetron HCl  4 mg  12/19/19 17:50  12/21/19 12:05





  Zofran Inj  IVP   4 mg





  Q6HR PRN   Administration





  Nausea / Vomiting   





     





     





     


 


Sodium Chloride  10 ml  12/20/19 01:00  12/21/19 10:42





  Normal Saline Flush 0.9%  IVP   Not Given





  0100,0900,1700 [facility]   





     





     





     





     














- Lab Result


Fish Bone Diagrams: 


                                 12/21/19 05:05





                                 12/21/19 15:40





- Additional Planning


My Orders: 





My Active Orders





12/20/19 13:00


D5.45ns W/20 Meq KCl 1,000 ml  mls/hr 





12/20/19 14:20


Nutrition Consult [CONS] Routine 





12/21/19 12:08


NPO except Meds [DIET] 














Subjective





- Subjective


Patient Reports: Abdominal Pain





Objective


Vital Signs: 





                               Vital Signs - 24 hr











  12/20/19 12/21/19 12/21/19





  15:53 00:00 08:00


 


Temperature 37 C 36.7 C 36.5 C


 


Heart Rate [  77 64





Brachial]   


 


Heart Rate [ 72  





Monitoring   





electrodes]   


 


Respiratory 22 18 20





Rate   


 


Blood Pressure 161/82 H 129/85 H 144/88 H





[Right Brachial   





artery]   


 


O2 Saturation 97 99 99








                                     Oxygen











O2 Source                      Room air














I&O (Last 24 Hrs): 





                          Intake and Output Totals x24h











 12/19/19 12/20/19 12/21/19





 23:59 23:59 23:59


 


Intake Total 3553.668 1119.333 1320


 


Balance 7003.931 7556.333 1320











General: Alert, Mild distress


HEENT: Atraumatic, PERRLA, EOMI


Neck: Supple


Lymphatic: no adenopathy


Neuro: Alert, Non Focal, Oriented Times 3


Cardiovascular: Regular rate, Normal S1, Normal S2


Respiratory: Chest non-tender, No respiratory distress, Breath sounds nml


Abdomen: Normal bowel sounds, Soft


Extremities: Normal pulses





- Results


Results: 





                               Laboratory Results











WBC  6.8 x10^3/uL (4.8-10.8)   12/21/19  05:05    


 


RBC  4.04 10^6/uL (4.20-5.40)  L  12/21/19  05:05    


 


Hgb  11.5 g/dL (12.0-16.0)  L  12/21/19  05:05    


 


Hct  36.7 % (37.0-47.0)  L  12/21/19  05:05    


 


MCV  90.8 fL (81.0-99.0)   12/21/19  05:05    


 


MCH  28.5 pg (27.0-31.0)   12/21/19  05:05    


 


MCHC  31.3 g/dL (32.0-36.0)  L  12/21/19  05:05    


 


RDW  13.3 % (12.0-15.0)   12/21/19  05:05    


 


Plt Count  168 10^3/uL (130-450)   12/21/19  05:05    


 


MPV  10.9 fL (7.9-10.8)  H  12/21/19  05:05    


 


Neut # (Auto)  5.0 10^3/uL (1.5-6.6)   12/21/19  05:05    


 


Lymph # (Auto)  1.1 10^3/uL (1.5-3.5)  L  12/21/19  05:05    


 


Mono # (Auto)  0.5 10^3/uL (0.0-1.0)   12/21/19  05:05    


 


Eos # (Auto)  0.2 10^3/uL (0.0-0.7)   12/21/19  05:05    


 


Baso # (Auto)  0.0 10^3/uL (0.0-0.1)   12/21/19  05:05    


 


Absolute Nucleated RBC  0.00 x10^3/uL  12/21/19  05:05    


 


Nucleated RBC %  0.0 /100WBC  12/21/19  05:05    


 


Sodium  143 mmol/L (135-145)   12/21/19  05:05    


 


Potassium  3.4 mmol/L (3.5-5.0)  L  12/21/19  05:05    


 


Chloride  107 mmol/L (101-111)   12/21/19  05:05    


 


Carbon Dioxide  28 mmol/L (21-32)   12/21/19  05:05    


 


Anion Gap  8.0  (6-13)   12/21/19  05:05    


 


BUN  6 mg/dL (6-20)   12/21/19  05:05    


 


Creatinine  0.5 mg/dL (0.4-1.0)   12/21/19  05:05    


 


Estimated GFR (MDRD)  125  (>89)   12/21/19  05:05    


 


Glucose  103 mg/dL ()  H  12/21/19  05:05    


 


Calcium  8.9 mg/dL (8.5-10.3)   12/21/19  05:05    


 


Total Bilirubin  1.3 mg/dL (0.2-1.0)  H  12/19/19  13:47    


 


AST  22 IU/L (10-42)   12/19/19  13:47    


 


ALT  18 IU/L (10-60)   12/19/19  13:47    


 


Alkaline Phosphatase  91 IU/L ()   12/19/19  13:47    


 


Total Protein  7.0 g/dL (6.7-8.2)   12/19/19  13:47    


 


Albumin  4.0 g/dL (3.2-5.5)   12/19/19  13:47    


 


Globulin  3.0 g/dL (2.1-4.2)   12/19/19  13:47    


 


Albumin/Globulin Ratio  1.3  (1.0-2.2)   12/19/19  13:47    


 


Lipase  25 U/L (22-51)   12/19/19  13:47    


 


Urine Color  YELLOW   12/19/19  16:00    


 


Urine Clarity  CLEAR  (CLEAR)   12/19/19  16:00    


 


Urine pH  6.0 PH (5.0-7.5)   12/19/19  16:00    


 


Ur Specific Gravity  1.010  (1.002-1.030)   12/19/19  16:00    


 


Urine Protein  NEGATIVE mg/dL (NEGATIVE)   12/19/19  16:00    


 


Urine Glucose (UA)  NEGATIVE mg/dL (NEGATIVE)   12/19/19  16:00    


 


Urine Ketones  NEGATIVE mg/dL (NEGATIVE)   12/19/19  16:00    


 


Urine Occult Blood  TRACE-LYSE  (NEGATIVE)   12/19/19  16:00    


 


Urine Nitrite  NEGATIVE  (NEGATIVE)   12/19/19  16:00    


 


Urine Bilirubin  NEGATIVE  (NEGATIVE)   12/19/19  16:00    


 


Urine Urobilinogen  0.2 (NORMAL) E.U./dL (NORMAL)   12/19/19  16:00    


 


Ur Leukocyte Esterase  NEGATIVE  (NEGATIVE)   12/19/19  16:00    


 


Ur Microscopic Review  NOT INDICATED   12/19/19  16:00    


 


Urine Culture Comments  NOT INDICATED   12/19/19  16:00    














ABX Reporting


Has patient been on IV antibiotics over the past 48 hours?: Yes





Current Medications





- Current Medications


Current Medications: 








Active Medications





Acetaminophen (Tylenol)  650 mg PO Q4HR PRN


   PRN Reason: Pain or Fever > 38C (100.4F)


   Last Admin: 12/21/19 06:04 Dose:  650 mg


Enoxaparin Sodium (Lovenox)  40 mg SUBQ DAILY [facility]


   Last Admin: 12/21/19 10:42 Dose:  Not Given


Hydralazine HCl (Apresoline Inj)  10 mg IVP QID PRN


   PRN Reason: Hypertensive Emergency


Piperacillin Sod/Tazobactam (Sod 3.375 gm/ Sodium Chloride)  100 mls @ 200 

mls/hr IV Q6H [facility]


   Last Infusion: 12/21/19 12:39 Dose:  Infused


Potassium Chloride/Dextrose/Sod Cl (D5.45ns W/20 Meq Kcl)  1,000 mls @ 100 

mls/hr IV .Q10H [facility]


   Last Admin: 12/21/19 11:12 Dose:  100 mls/hr


Morphine Sulfate (Morphine (Carpuject))  2 mg IVP Q2HR PRN


   PRN Reason: Pain 8 to 10


   Last Admin: 12/21/19 12:01 Dose:  2 mg


Ondansetron HCl (Zofran Inj)  4 mg IVP Q6HR PRN


   PRN Reason: Nausea / Vomiting


   Last Admin: 12/21/19 12:05 Dose:  4 mg


Ondansetron HCl (Zofran Odt)  4 mg TL Q6HR PRN


   PRN Reason: Nausea / Vomiting


Prochlorperazine Edisylate (Compazine Inj)  10 mg IVP Q6HR PRN


   PRN Reason: Nausea / Vomiting


Sodium Chloride (Normal Saline Flush 0.9%)  10 ml IVP PRN PRN


   PRN Reason: AS NEEDED PER PROVIDER ORDERS


Sodium Chloride (Normal Saline Flush 0.9%)  10 ml IVP 0100,0900,1700 [facility]


   Last Admin: 12/21/19 10:42 Dose:  Not Given





                                        





No Known Home Medications  12/20/19

## 2019-12-21 NOTE — PROVIDER PROGRESS NOTE
Assessment/Plan





- Problem List


(1) Acute diverticulitis


Assessment/Plan: 


Clinically slightly worse today, but disease appears to remain localized 

clinically, with no evidence of generalized peritonitis or abscess or bowel 

obstruction or generalized sepsis. Rec: add metronidazole to pip/octaviano, add 

ketorolac and IV acetaminophen to analgesic regimen,  ow continue present 

management. Discussed in detail with patient, , and hospitalist who agree

with plan to try to avoid emergency surgery and need for colostomy. 








- Current Meds


Current Meds: 





                               Current Medications











Generic Name Dose Route Start Last Admin





  Trade Name Freq  PRN Reason Stop Dose Admin


 


Enoxaparin Sodium  40 mg  12/20/19 09:00  12/21/19 10:42





  Lovenox  SUBQ   Not Given





  DAILY SAUD   





     





     





     





     


 


Piperacillin Sod/Tazobactam  100 mls @ 200 mls/hr  12/20/19 00:00  12/21/19 

12:39





  Sod 3.375 gm/ Sodium Chloride  IV   Infused





  Q6H SAUD   Infusion





     





     





     





     


 


Potassium Chloride/Dextrose/Sod Cl  1,000 mls @ 100 mls/hr  12/20/19 13:00  

12/21/19 11:12





  D5.45ns W/20 Meq Kcl  IV   100 mls/hr





  .Q10H SAUD   Administration





     





     





     





     


 


Morphine Sulfate  2 mg  12/19/19 17:50  12/21/19 12:01





  Morphine (Carpuject)  IVP   2 mg





  Q2HR PRN   Administration





  Pain 8 to 10   





     





     





     


 


Ondansetron HCl  4 mg  12/19/19 17:50  12/21/19 12:05





  Zofran Inj  IVP   4 mg





  Q6HR PRN   Administration





  Nausea / Vomiting   





     





     





     


 


Sodium Chloride  10 ml  12/20/19 01:00  12/21/19 10:42





  Normal Saline Flush 0.9%  IVP   Not Given





  0100,0900,1700 SAUD   





     





     





     





     














- Lab Result


Fish Bone Diagrams: 


                                 12/21/19 05:05





                                 12/21/19 05:05





- Diagnostic Imaging Results


Diagnostic Imaging Results: Final report reviewed, Read independently


Diagnostic Imaging Results Comments: 





repeat CT today shows increased inflammatory changes around sigmoid colon but no

definite abscess or significant free fluid; continued tiny pockets of free air 

evident in the upper abd cavity.





- Additional Planning


Condition/Complexity: Stable


My Orders: 





My Active Orders





12/21/19


CMP [COMPREHENSIVE METABOLIC PANEL] [CHEM] Routine 





12/21/19 15:15


Acetaminophen 1,000 mg/100 ml [Ofirmev] 100 ml IV Q6HR 


Ketorolac Inj (30Mg) [Toradol Inj (30Mg)]   30 mg IVP Q6HR PRN 











Plan Discussed with:: Patient, Spouse, Other (hospitalist)


Time Spent: 31-60 minutes





Subjective





- Subjective


Patient Reports: Abdominal Pain (felt well until took dose of oral potassium, 

then felt much worse with increased abd pain; now feeling much better with pain 

level 2 in bed; passing flatus and stool, minimal nausea(after narcotic admin) 

no vomiting.), Nausea





Objective


Vital Signs: 





                               Vital Signs - 24 hr











  12/20/19 12/21/19 12/21/19





  15:53 00:00 08:00


 


Temperature 37 C 36.7 C 36.5 C


 


Heart Rate [  77 64





Brachial]   


 


Heart Rate [ 72  





Monitoring   





electrodes]   


 


Respiratory 22 18 20





Rate   


 


Blood Pressure 161/82 H 129/85 H 144/88 H





[Right Brachial   





artery]   


 


O2 Saturation 97 99 99














  12/21/19





  14:07


 


Temperature 37.1 C


 


Heart Rate [ 88





Brachial] 


 


Heart Rate [ 





Monitoring 





electrodes] 


 


Respiratory 18





Rate 


 


Blood Pressure 140/82 H





[Right Brachial 





artery] 


 


O2 Saturation 94








                                     Oxygen











O2 Source                      Room air














I&O (Last 24 Hrs): 





                          Intake and Output Totals x24h











 12/19/19 12/20/19 12/21/19





 23:59 23:59 23:59


 


Intake Total 9178.627 0319.333 1370


 


Balance 0782.544 8235.333 1370











General: Alert, Oriented x3, Cooperative, Mild distress


HEENT: Mucous membr. moist/pink


Neck: No JVD


Neuro: Alert


Cardiovascular: Regular rate, Normal S1, Normal S2


Respiratory: Chest non-tender, No respiratory distress, Breath sounds nml


Abdomen: Normal bowel sounds, Other (tender in LLQ with guarding/rebound; no 

generalized peritoneal signs)


Extremities: No edema, No tenderness/swelling


Skin: No rashes





- Results


Results: 





                               Laboratory Results











WBC  6.8 x10^3/uL (4.8-10.8)   12/21/19  05:05    


 


RBC  4.04 10^6/uL (4.20-5.40)  L  12/21/19  05:05    


 


Hgb  11.5 g/dL (12.0-16.0)  L  12/21/19  05:05    


 


Hct  36.7 % (37.0-47.0)  L  12/21/19  05:05    


 


MCV  90.8 fL (81.0-99.0)   12/21/19  05:05    


 


MCH  28.5 pg (27.0-31.0)   12/21/19  05:05    


 


MCHC  31.3 g/dL (32.0-36.0)  L  12/21/19  05:05    


 


RDW  13.3 % (12.0-15.0)   12/21/19  05:05    


 


Plt Count  168 10^3/uL (130-450)   12/21/19  05:05    


 


MPV  10.9 fL (7.9-10.8)  H  12/21/19  05:05    


 


Neut # (Auto)  5.0 10^3/uL (1.5-6.6)   12/21/19  05:05    


 


Lymph # (Auto)  1.1 10^3/uL (1.5-3.5)  L  12/21/19  05:05    


 


Mono # (Auto)  0.5 10^3/uL (0.0-1.0)   12/21/19  05:05    


 


Eos # (Auto)  0.2 10^3/uL (0.0-0.7)   12/21/19  05:05    


 


Baso # (Auto)  0.0 10^3/uL (0.0-0.1)   12/21/19  05:05    


 


Absolute Nucleated RBC  0.00 x10^3/uL  12/21/19  05:05    


 


Nucleated RBC %  0.0 /100WBC  12/21/19  05:05    


 


Sodium  143 mmol/L (135-145)   12/21/19  05:05    


 


Potassium  3.4 mmol/L (3.5-5.0)  L  12/21/19  05:05    


 


Chloride  107 mmol/L (101-111)   12/21/19  05:05    


 


Carbon Dioxide  28 mmol/L (21-32)   12/21/19  05:05    


 


Anion Gap  8.0  (6-13)   12/21/19  05:05    


 


BUN  6 mg/dL (6-20)   12/21/19  05:05    


 


Creatinine  0.5 mg/dL (0.4-1.0)   12/21/19  05:05    


 


Estimated GFR (MDRD)  125  (>89)   12/21/19  05:05    


 


Glucose  103 mg/dL ()  H  12/21/19  05:05    


 


Calcium  8.9 mg/dL (8.5-10.3)   12/21/19  05:05    


 


Total Bilirubin  1.3 mg/dL (0.2-1.0)  H  12/19/19  13:47    


 


AST  22 IU/L (10-42)   12/19/19  13:47    


 


ALT  18 IU/L (10-60)   12/19/19  13:47    


 


Alkaline Phosphatase  91 IU/L ()   12/19/19  13:47    


 


Total Protein  7.0 g/dL (6.7-8.2)   12/19/19  13:47    


 


Albumin  4.0 g/dL (3.2-5.5)   12/19/19  13:47    


 


Globulin  3.0 g/dL (2.1-4.2)   12/19/19  13:47    


 


Albumin/Globulin Ratio  1.3  (1.0-2.2)   12/19/19  13:47    


 


Lipase  25 U/L (22-51)   12/19/19  13:47    


 


Urine Color  YELLOW   12/19/19  16:00    


 


Urine Clarity  CLEAR  (CLEAR)   12/19/19  16:00    


 


Urine pH  6.0 PH (5.0-7.5)   12/19/19  16:00    


 


Ur Specific Gravity  1.010  (1.002-1.030)   12/19/19  16:00    


 


Urine Protein  NEGATIVE mg/dL (NEGATIVE)   12/19/19  16:00    


 


Urine Glucose (UA)  NEGATIVE mg/dL (NEGATIVE)   12/19/19  16:00    


 


Urine Ketones  NEGATIVE mg/dL (NEGATIVE)   12/19/19  16:00    


 


Urine Occult Blood  TRACE-LYSE  (NEGATIVE)   12/19/19  16:00    


 


Urine Nitrite  NEGATIVE  (NEGATIVE)   12/19/19  16:00    


 


Urine Bilirubin  NEGATIVE  (NEGATIVE)   12/19/19  16:00    


 


Urine Urobilinogen  0.2 (NORMAL) E.U./dL (NORMAL)   12/19/19  16:00    


 


Ur Leukocyte Esterase  NEGATIVE  (NEGATIVE)   12/19/19  16:00    


 


Ur Microscopic Review  NOT INDICATED   12/19/19  16:00    


 


Urine Culture Comments  NOT INDICATED   12/19/19  16:00    














Sepsis Event Note (H)





- Evaluation


Current Stage of Sepsis: Ruled out





ABX Reporting


Has patient been on IV antibiotics over the past 48 hours?: Yes

## 2019-12-22 LAB
ANION GAP SERPL CALCULATED.4IONS-SCNC: 7 MMOL/L (ref 6–13)
BASOPHILS NFR BLD AUTO: 0 10^3/UL (ref 0–0.1)
BASOPHILS NFR BLD AUTO: 0.3 %
BUN SERPL-MCNC: 7 MG/DL (ref 6–20)
CALCIUM UR-MCNC: 8.8 MG/DL (ref 8.5–10.3)
CHLORIDE SERPL-SCNC: 107 MMOL/L (ref 101–111)
CO2 SERPL-SCNC: 26 MMOL/L (ref 21–32)
CREAT SERPLBLD-SCNC: 0.7 MG/DL (ref 0.4–1)
EOSINOPHIL # BLD AUTO: 0 10^3/UL (ref 0–0.7)
EOSINOPHIL NFR BLD AUTO: 0.4 %
ERYTHROCYTE [DISTWIDTH] IN BLOOD BY AUTOMATED COUNT: 13.6 % (ref 12–15)
GFRSERPLBLD MDRD-ARVRAT: 85 ML/MIN/{1.73_M2} (ref 89–?)
GLUCOSE SERPL-MCNC: 151 MG/DL (ref 70–100)
HGB UR QL STRIP: 12.8 G/DL (ref 12–16)
LYMPHOCYTES # SPEC AUTO: 0.5 10^3/UL (ref 1.5–3.5)
LYMPHOCYTES NFR BLD AUTO: 4.8 %
MCH RBC QN AUTO: 27.9 PG (ref 27–31)
MCHC RBC AUTO-ENTMCNC: 31.2 G/DL (ref 32–36)
MCV RBC AUTO: 89.5 FL (ref 81–99)
MONOCYTES # BLD AUTO: 0.8 10^3/UL (ref 0–1)
MONOCYTES NFR BLD AUTO: 8.1 %
NEUTROPHILS # BLD AUTO: 8.2 10^3/UL (ref 1.5–6.6)
NEUTROPHILS # SNV AUTO: 9.5 X10^3/UL (ref 4.8–10.8)
NEUTROPHILS NFR BLD AUTO: 85.8 %
PDW BLD AUTO: 10.5 FL (ref 7.9–10.8)
PLATELET # BLD: 213 10^3/UL (ref 130–450)
RBC MAR: 4.58 10^6/UL (ref 4.2–5.4)
SODIUM SERPLBLD-SCNC: 140 MMOL/L (ref 135–145)

## 2019-12-22 RX ADMIN — SODIUM CHLORIDE SCH MLS/HR: 9 INJECTION, SOLUTION INTRAVENOUS at 12:15

## 2019-12-22 RX ADMIN — ALUMINUM HYDROXIDE, MAGNESIUM HYDROXIDE, AND SIMETHICONE PRN ML: 200; 200; 20 SUSPENSION ORAL at 13:25

## 2019-12-22 RX ADMIN — ONDANSETRON SCH MG: 4 TABLET, ORALLY DISINTEGRATING ORAL at 18:44

## 2019-12-22 RX ADMIN — SODIUM CHLORIDE SCH MLS/HR: 9 INJECTION, SOLUTION INTRAVENOUS at 06:11

## 2019-12-22 RX ADMIN — SODIUM CHLORIDE, PRESERVATIVE FREE SCH: 5 INJECTION INTRAVENOUS at 16:16

## 2019-12-22 RX ADMIN — SODIUM CHLORIDE SCH MLS/HR: 9 INJECTION, SOLUTION INTRAVENOUS at 18:45

## 2019-12-22 RX ADMIN — POTASSIUM CHLORIDE, DEXTROSE MONOHYDRATE AND SODIUM CHLORIDE SCH: 150; 5; 450 INJECTION, SOLUTION INTRAVENOUS at 06:11

## 2019-12-22 RX ADMIN — ALUMINUM HYDROXIDE, MAGNESIUM HYDROXIDE, AND SIMETHICONE PRN ML: 200; 200; 20 SUSPENSION ORAL at 04:55

## 2019-12-22 RX ADMIN — ACETAMINOPHEN PRN MLS/HR: 10 INJECTION, SOLUTION INTRAVENOUS at 16:09

## 2019-12-22 RX ADMIN — ACETAMINOPHEN PRN MLS/HR: 10 INJECTION, SOLUTION INTRAVENOUS at 08:45

## 2019-12-22 RX ADMIN — ONDANSETRON SCH MG: 4 TABLET, ORALLY DISINTEGRATING ORAL at 12:03

## 2019-12-22 RX ADMIN — ONDANSETRON PRN MG: 2 INJECTION INTRAMUSCULAR; INTRAVENOUS at 08:02

## 2019-12-22 RX ADMIN — ENOXAPARIN SODIUM SCH: 100 INJECTION SUBCUTANEOUS at 08:06

## 2019-12-22 RX ADMIN — ONDANSETRON PRN MG: 2 INJECTION INTRAMUSCULAR; INTRAVENOUS at 02:35

## 2019-12-22 RX ADMIN — ALUMINUM HYDROXIDE, MAGNESIUM HYDROXIDE, AND SIMETHICONE PRN ML: 200; 200; 20 SUSPENSION ORAL at 18:41

## 2019-12-22 RX ADMIN — KETOROLAC TROMETHAMINE SCH MG: 30 INJECTION, SOLUTION INTRAMUSCULAR at 18:42

## 2019-12-22 RX ADMIN — SODIUM CHLORIDE SCH MG: 9 INJECTION, SOLUTION INTRAVENOUS at 11:04

## 2019-12-22 RX ADMIN — ALUMINUM HYDROXIDE, MAGNESIUM HYDROXIDE, AND SIMETHICONE PRN ML: 200; 200; 20 SUSPENSION ORAL at 22:52

## 2019-12-22 RX ADMIN — POTASSIUM CHLORIDE, DEXTROSE MONOHYDRATE AND SODIUM CHLORIDE SCH MLS/HR: 150; 5; 450 INJECTION, SOLUTION INTRAVENOUS at 16:11

## 2019-12-22 RX ADMIN — ACETAMINOPHEN PRN MLS/HR: 10 INJECTION, SOLUTION INTRAVENOUS at 22:19

## 2019-12-22 RX ADMIN — ONDANSETRON PRN MG: 2 INJECTION INTRAMUSCULAR; INTRAVENOUS at 16:10

## 2019-12-22 RX ADMIN — ALUMINUM HYDROXIDE, MAGNESIUM HYDROXIDE, AND SIMETHICONE PRN ML: 200; 200; 20 SUSPENSION ORAL at 01:04

## 2019-12-22 RX ADMIN — SODIUM CHLORIDE, PRESERVATIVE FREE SCH: 5 INJECTION INTRAVENOUS at 09:33

## 2019-12-22 RX ADMIN — SODIUM CHLORIDE, PRESERVATIVE FREE SCH: 5 INJECTION INTRAVENOUS at 01:48

## 2019-12-22 RX ADMIN — ACETAMINOPHEN PRN MLS/HR: 10 INJECTION, SOLUTION INTRAVENOUS at 02:39

## 2019-12-22 RX ADMIN — ALUMINUM HYDROXIDE, MAGNESIUM HYDROXIDE, AND SIMETHICONE PRN ML: 200; 200; 20 SUSPENSION ORAL at 09:03

## 2019-12-22 RX ADMIN — KETOROLAC TROMETHAMINE SCH MG: 30 INJECTION, SOLUTION INTRAMUSCULAR at 23:57

## 2019-12-22 NOTE — PROVIDER PROGRESS NOTE
Assessment/Plan





- Problem List


(1) Acute diverticulitis


Assessment/Plan: 


Remains stable; LLQ pain is improved but she appears to be having side effects 

from antibiotics(N/V), but can not r/o ileus related to diverticulitis. Rec: 

continue present management; avoid po until sx improved. Re-image as necessary 

if fails to improve over next 24-48 hours.








- Current Meds


Current Meds: 





                               Current Medications











Generic Name Dose Route Start Last Admin





  Trade Name Freq  PRN Reason Stop Dose Admin


 


Al Hydroxide/Mg Hydroxide  30 ml  12/22/19 00:10  12/22/19 09:03





  Mylanta Plus  PO   30 ml





  Q4HR PRN   Administration





  INDIGESTION   





     





     





     


 


Enoxaparin Sodium  40 mg  12/20/19 09:00  12/22/19 08:06





  Lovenox  SUBQ   Not Given





  DAILY SAUD   





     





     





     





     


 


Piperacillin Sod/Tazobactam  100 mls @ 200 mls/hr  12/20/19 00:00  12/22/19 

06:49





  Sod 3.375 gm/ Sodium Chloride  IV   Infused





  Q6H SAUD   Infusion





     





     





     





     


 


Potassium Chloride/Dextrose/Sod Cl  1,000 mls @ 100 mls/hr  12/20/19 13:00  

12/22/19 06:11





  D5.45ns W/20 Meq Kcl  IV   Not Given





  .Q10H SAUD   





     





     





     





     


 


Metronidazole  500 mg in 100 mls @ 100 mls/hr  12/21/19 16:00  12/22/19 10:09





  Flagyl 500 Mg/100 Ml  IV   Infused





  Q8H SAUD   Infusion





     





     





     





     


 


Acetaminophen  100 mls @ 400 mls/hr  12/21/19 16:00  12/22/19 09:33





  Ofirmev  IV   Infused





  Q6H PRN   Infusion





  PAIN   





     





     





     


 


Ketorolac Tromethamine  30 mg  12/21/19 15:15  12/21/19 21:52





  Toradol Inj (30mg)  IVP  12/26/19 15:14  30 mg





  Q6HR PRN   Administration





  PAIN   





     





     





     


 


Morphine Sulfate  2 mg  12/19/19 17:50  12/21/19 12:01





  Morphine (Carpuject)  IVP   2 mg





  Q2HR PRN   Administration





  Pain 8 to 10   





     





     





     


 


Ondansetron HCl  4 mg  12/19/19 17:50  12/22/19 08:02





  Zofran Inj  IVP   4 mg





  Q6HR PRN   Administration





  Nausea / Vomiting   





     





     





     


 


Pantoprazole Sodium  40 mg  12/22/19 07:00  12/22/19 06:21





  Protonix  PO   40 mg





  QDAC SAUD   Administration





     





     





     





     


 


Sodium Chloride  10 ml  12/20/19 01:00  12/22/19 09:33





  Normal Saline Flush 0.9%  IVP   Not Given





  0100,0900,1700 SAUD   





     





     





     





     














- Lab Result


Lab results reviewed: Yes


Fish Bone Diagrams: 


                                 12/22/19 04:44





                                 12/22/19 04:44





- Additional Planning


Condition/Complexity: Stable


My Orders: 





My Active Orders





12/21/19 15:15


Ketorolac Inj (30Mg) [Toradol Inj (30Mg)]   30 mg IVP Q6HR PRN 





12/21/19 16:00


Acetaminophen 1,000 mg/100 ml [Ofirmev] 100 ml IV Q6H 











Plan Discussed with:: Patient, Family, Spouse


Time Spent: 15-30 minutes





Subjective





- Subjective


Patient Reports: Abdominal Pain (diminished LLQ pain; no longer using 

narcotics), Nausea (pt believes related to metronidazole)





Objective


Vital Signs: 





                               Vital Signs - 24 hr











  12/21/19 12/21/19 12/22/19





  14:07 16:00 00:00


 


Temperature 37.1 C 37.7 C H 37.0 C


 


Heart Rate [ 88 90 84





Brachial]   


 


Respiratory 18 18 18





Rate   


 


Blood Pressure 140/82 H 121/67 115/66





[Right Brachial   





artery]   


 


O2 Saturation 94 97 94














  12/22/19





  07:35


 


Temperature 36.6 C


 


Heart Rate [ 76





Brachial] 


 


Respiratory 20





Rate 


 


Blood Pressure 132/80 H





[Right Brachial 





artery] 


 


O2 Saturation 95








                                     Oxygen











O2 Source                      Room air














I&O (Last 24 Hrs): 





                          Intake and Output Totals x24h











 12/20/19 12/21/19 12/22/19





 23:59 23:59 23:59


 


Intake Total 4213.333 3270 600


 


Output Total   200


 


Balance 4213.333 3270 400











General: Alert, Oriented x3, Cooperative, Mild distress


HEENT: Mucous membr. moist/pink


Neck: No JVD


Cardiovascular: Regular rate, Normal S1, Normal S2, No murmurs


Respiratory: Chest non-tender, No respiratory distress, Breath sounds nml


Abdomen: Other (hypoactive bowel tones; tender in LLQ, improved compared to 

yesterday; obese, unclear if distended)


Extremities: No cyanosis, No edema, No tenderness/swelling





- Results


Results: 





                               Laboratory Results











WBC  9.5 x10^3/uL (4.8-10.8)   12/22/19  04:44    


 


RBC  4.58 10^6/uL (4.20-5.40)   12/22/19  04:44    


 


Hgb  12.8 g/dL (12.0-16.0)   12/22/19  04:44    


 


Hct  41.0 % (37.0-47.0)   12/22/19  04:44    


 


MCV  89.5 fL (81.0-99.0)   12/22/19  04:44    


 


MCH  27.9 pg (27.0-31.0)   12/22/19  04:44    


 


MCHC  31.2 g/dL (32.0-36.0)  L  12/22/19  04:44    


 


RDW  13.6 % (12.0-15.0)   12/22/19  04:44    


 


Plt Count  213 10^3/uL (130-450)   12/22/19  04:44    


 


MPV  10.5 fL (7.9-10.8)   12/22/19  04:44    


 


Neut # (Auto)  8.2 10^3/uL (1.5-6.6)  H  12/22/19  04:44    


 


Lymph # (Auto)  0.5 10^3/uL (1.5-3.5)  L  12/22/19  04:44    


 


Mono # (Auto)  0.8 10^3/uL (0.0-1.0)   12/22/19  04:44    


 


Eos # (Auto)  0.0 10^3/uL (0.0-0.7)   12/22/19  04:44    


 


Baso # (Auto)  0.0 10^3/uL (0.0-0.1)   12/22/19  04:44    


 


Absolute Nucleated RBC  0.00 x10^3/uL  12/22/19  04:44    


 


Nucleated RBC %  0.0 /100WBC  12/22/19  04:44    


 


Sodium  140 mmol/L (135-145)   12/22/19  04:44    


 


Potassium  3.6 mmol/L (3.5-5.0)   12/22/19  04:44    


 


Chloride  107 mmol/L (101-111)   12/22/19  04:44    


 


Carbon Dioxide  26 mmol/L (21-32)   12/22/19  04:44    


 


Anion Gap  7.0  (6-13)   12/22/19  04:44    


 


BUN  7 mg/dL (6-20)   12/22/19  04:44    


 


Creatinine  0.7 mg/dL (0.4-1.0)   12/22/19  04:44    


 


Estimated GFR (MDRD)  85  (>89)  L  12/22/19  04:44    


 


Glucose  151 mg/dL ()  H  12/22/19  04:44    


 


Calcium  8.8 mg/dL (8.5-10.3)   12/22/19  04:44    


 


Total Bilirubin  0.8 mg/dL (0.2-1.0)   12/21/19  15:40    


 


AST  16 IU/L (10-42)   12/21/19  15:40    


 


ALT  13 IU/L (10-60)   12/21/19  15:40    


 


Alkaline Phosphatase  90 IU/L ()   12/21/19  15:40    


 


Total Protein  6.3 g/dL (6.7-8.2)  L  12/21/19  15:40    


 


Albumin  3.4 g/dL (3.2-5.5)   12/21/19  15:40    


 


Globulin  2.9 g/dL (2.1-4.2)   12/21/19  15:40    


 


Albumin/Globulin Ratio  1.2  (1.0-2.2)   12/21/19  15:40    


 


Lipase  25 U/L (22-51)   12/19/19  13:47    


 


Urine Color  YELLOW   12/19/19  16:00    


 


Urine Clarity  CLEAR  (CLEAR)   12/19/19  16:00    


 


Urine pH  6.0 PH (5.0-7.5)   12/19/19  16:00    


 


Ur Specific Gravity  1.010  (1.002-1.030)   12/19/19  16:00    


 


Urine Protein  NEGATIVE mg/dL (NEGATIVE)   12/19/19  16:00    


 


Urine Glucose (UA)  NEGATIVE mg/dL (NEGATIVE)   12/19/19  16:00    


 


Urine Ketones  NEGATIVE mg/dL (NEGATIVE)   12/19/19  16:00    


 


Urine Occult Blood  TRACE-LYSE  (NEGATIVE)   12/19/19  16:00    


 


Urine Nitrite  NEGATIVE  (NEGATIVE)   12/19/19  16:00    


 


Urine Bilirubin  NEGATIVE  (NEGATIVE)   12/19/19  16:00    


 


Urine Urobilinogen  0.2 (NORMAL) E.U./dL (NORMAL)   12/19/19  16:00    


 


Ur Leukocyte Esterase  NEGATIVE  (NEGATIVE)   12/19/19  16:00    


 


Ur Microscopic Review  NOT INDICATED   12/19/19  16:00    


 


Urine Culture Comments  NOT INDICATED   12/19/19  16:00    














Sepsis Event Note (H)





- Evaluation


Current Stage of Sepsis: Ruled out





ABX Reporting


Has patient been on IV antibiotics over the past 48 hours?: Yes

## 2019-12-22 NOTE — PROVIDER PROGRESS NOTE
Assessment/Plan





- Problem List


(1) Acute diverticulitis


Assessment/Plan: 


Her left lower quadrant pain has improved and now she has nausea and vomiting 

which may be from an ileus or side effect of the antibiotics.


We will continue with IV antibiotics.


Await any cultures








(2) Nausea


Assessment/Plan: 


We will plan scheduled antiemetics so that she does not get breakthrough nausea.


Continue clear liquid diet, not ready to advance yet








(3) HTN (hypertension)


Assessment/Plan: 


BP has been stable








- Current Meds


Current Meds: 





                               Current Medications











Generic Name Dose Route Start Last Admin





  Trade Name Freq  PRN Reason Stop Dose Admin


 


Al Hydroxide/Mg Hydroxide  30 ml  12/22/19 00:10  12/22/19 13:25





  Mylanta Plus  PO   30 ml





  Q4HR PRN   Administration





  INDIGESTION   





     





     





     


 


Enoxaparin Sodium  40 mg  12/20/19 09:00  12/22/19 08:06





  Lovenox  SUBQ   Not Given





  DAILY SAUD   





     





     





     





     


 


Piperacillin Sod/Tazobactam  100 mls @ 200 mls/hr  12/20/19 00:00  12/22/19 

13:36





  Sod 3.375 gm/ Sodium Chloride  IV   Infused





  Q6H SAUD   Infusion





     





     





     





     


 


Potassium Chloride/Dextrose/Sod Cl  1,000 mls @ 100 mls/hr  12/20/19 13:00  

12/22/19 11:27





  D5.45ns W/20 Meq Kcl  IV   Infused





  .Q10H SAUD   Infusion





     





     





     





     


 


Metronidazole  500 mg in 100 mls @ 100 mls/hr  12/21/19 16:00  12/22/19 10:09





  Flagyl 500 Mg/100 Ml  IV   Infused





  Q8H SAUD   Infusion





     





     





     





     


 


Acetaminophen  100 mls @ 400 mls/hr  12/21/19 16:00  12/22/19 09:33





  Ofirmev  IV   Infused





  Q6H PRN   Infusion





  PAIN   





     





     





     


 


Morphine Sulfate  2 mg  12/19/19 17:50  12/21/19 12:01





  Morphine (Carpuject)  IVP   2 mg





  Q2HR PRN   Administration





  Pain 8 to 10   





     





     





     


 


Ondansetron HCl  4 mg  12/19/19 17:50  12/22/19 08:02





  Zofran Inj  IVP   4 mg





  Q6HR PRN   Administration





  Nausea / Vomiting   





     





     





     


 


Ondansetron HCl  4 mg  12/22/19 12:00  12/22/19 12:03





  Zofran Odt  TL  12/23/19 12:00  4 mg





  Q6HR SAUD   Administration





     





     





     





     


 


Pantoprazole Sodium  40 mg  12/22/19 11:00  12/22/19 11:04





  Protonix  IVP   40 mg





  QDAC SAUD   Administration





     





     





     





     


 


Sodium Chloride  10 ml  12/20/19 01:00  12/22/19 09:33





  Normal Saline Flush 0.9%  IVP   Not Given





  0100,0900,1700 SAUD   





     





     





     





     














- Lab Result


Fish Bone Diagrams: 


                                 12/23/19 11:55





                                 12/23/19 11:55





- Additional Planning


My Orders: 





My Active Orders





12/22/19 12:00


Ondansetron Odt [Zofran Odt]   4 mg TL Q6HR 





12/22/19 18:00


Ketorolac Inj (30Mg) [Toradol Inj (30Mg)]   30 mg IVP Q6HR 














Subjective





- Subjective


Patient Reports: Abdominal Pain, Diarrhea, Nausea





Objective


Vital Signs: 





                               Vital Signs - 24 hr











  12/22/19 12/22/19





  00:00 07:35


 


Temperature 37.0 C 36.6 C


 


Heart Rate [ 84 76





Brachial]  


 


Respiratory 18 20





Rate  


 


Blood Pressure 115/66 132/80 H





[Right Brachial  





artery]  


 


O2 Saturation 94 95








                                     Oxygen











O2 Source                      Room air














I&O (Last 24 Hrs): 





                          Intake and Output Totals x24h











 12/20/19 12/21/19 12/22/19





 23:59 23:59 23:59


 


Intake Total 4213.333 3270 1700


 


Output Total   200


 


Balance 4213.333 3270 1500











General: Alert, Other (Appears disheveled and tired)


HEENT: Mucous membr. moist/pink


Neck: Supple


Neuro: Non Focal


Cardiovascular: Regular rate


Respiratory: No respiratory distress


Abdomen: Normal bowel sounds, Soft


Extremities: No edema





- Results


Results: 





                               Laboratory Results











WBC  9.5 x10^3/uL (4.8-10.8)   12/22/19  04:44    


 


RBC  4.58 10^6/uL (4.20-5.40)   12/22/19  04:44    


 


Hgb  12.8 g/dL (12.0-16.0)   12/22/19  04:44    


 


Hct  41.0 % (37.0-47.0)   12/22/19  04:44    


 


MCV  89.5 fL (81.0-99.0)   12/22/19  04:44    


 


MCH  27.9 pg (27.0-31.0)   12/22/19  04:44    


 


MCHC  31.2 g/dL (32.0-36.0)  L  12/22/19  04:44    


 


RDW  13.6 % (12.0-15.0)   12/22/19  04:44    


 


Plt Count  213 10^3/uL (130-450)   12/22/19  04:44    


 


MPV  10.5 fL (7.9-10.8)   12/22/19  04:44    


 


Neut # (Auto)  8.2 10^3/uL (1.5-6.6)  H  12/22/19  04:44    


 


Lymph # (Auto)  0.5 10^3/uL (1.5-3.5)  L  12/22/19  04:44    


 


Mono # (Auto)  0.8 10^3/uL (0.0-1.0)   12/22/19  04:44    


 


Eos # (Auto)  0.0 10^3/uL (0.0-0.7)   12/22/19  04:44    


 


Baso # (Auto)  0.0 10^3/uL (0.0-0.1)   12/22/19  04:44    


 


Absolute Nucleated RBC  0.00 x10^3/uL  12/22/19  04:44    


 


Nucleated RBC %  0.0 /100WBC  12/22/19  04:44    


 


Sodium  140 mmol/L (135-145)   12/22/19  04:44    


 


Potassium  3.6 mmol/L (3.5-5.0)   12/22/19  04:44    


 


Chloride  107 mmol/L (101-111)   12/22/19  04:44    


 


Carbon Dioxide  26 mmol/L (21-32)   12/22/19  04:44    


 


Anion Gap  7.0  (6-13)   12/22/19  04:44    


 


BUN  7 mg/dL (6-20)   12/22/19  04:44    


 


Creatinine  0.7 mg/dL (0.4-1.0)   12/22/19  04:44    


 


Estimated GFR (MDRD)  85  (>89)  L  12/22/19  04:44    


 


Glucose  151 mg/dL ()  H  12/22/19  04:44    


 


Calcium  8.8 mg/dL (8.5-10.3)   12/22/19  04:44    


 


Total Bilirubin  0.8 mg/dL (0.2-1.0)   12/21/19  15:40    


 


AST  16 IU/L (10-42)   12/21/19  15:40    


 


ALT  13 IU/L (10-60)   12/21/19  15:40    


 


Alkaline Phosphatase  90 IU/L ()   12/21/19  15:40    


 


Total Protein  6.3 g/dL (6.7-8.2)  L  12/21/19  15:40    


 


Albumin  3.4 g/dL (3.2-5.5)   12/21/19  15:40    


 


Globulin  2.9 g/dL (2.1-4.2)   12/21/19  15:40    


 


Albumin/Globulin Ratio  1.2  (1.0-2.2)   12/21/19  15:40    


 


Lipase  25 U/L (22-51)   12/19/19  13:47    


 


Urine Color  YELLOW   12/19/19  16:00    


 


Urine Clarity  CLEAR  (CLEAR)   12/19/19  16:00    


 


Urine pH  6.0 PH (5.0-7.5)   12/19/19  16:00    


 


Ur Specific Gravity  1.010  (1.002-1.030)   12/19/19  16:00    


 


Urine Protein  NEGATIVE mg/dL (NEGATIVE)   12/19/19  16:00    


 


Urine Glucose (UA)  NEGATIVE mg/dL (NEGATIVE)   12/19/19  16:00    


 


Urine Ketones  NEGATIVE mg/dL (NEGATIVE)   12/19/19  16:00    


 


Urine Occult Blood  TRACE-LYSE  (NEGATIVE)   12/19/19  16:00    


 


Urine Nitrite  NEGATIVE  (NEGATIVE)   12/19/19  16:00    


 


Urine Bilirubin  NEGATIVE  (NEGATIVE)   12/19/19  16:00    


 


Urine Urobilinogen  0.2 (NORMAL) E.U./dL (NORMAL)   12/19/19  16:00    


 


Ur Leukocyte Esterase  NEGATIVE  (NEGATIVE)   12/19/19  16:00    


 


Ur Microscopic Review  NOT INDICATED   12/19/19  16:00    


 


Urine Culture Comments  NOT INDICATED   12/19/19  16:00    














Sepsis Event Note (H)





- Evaluation


Current Stage of Sepsis: Ruled out

## 2019-12-23 LAB
ALBUMIN DIAFP-MCNC: 3 G/DL (ref 3.2–5.5)
ALBUMIN/GLOB SERPL: 0.9 {RATIO} (ref 1–2.2)
ALP SERPL-CCNC: 72 IU/L (ref 42–121)
ALT SERPL W P-5'-P-CCNC: 10 IU/L (ref 10–60)
ANION GAP SERPL CALCULATED.4IONS-SCNC: 8 MMOL/L (ref 6–13)
AST SERPL W P-5'-P-CCNC: 10 IU/L (ref 10–42)
BASOPHILS NFR BLD AUTO: 0 10^3/UL (ref 0–0.1)
BASOPHILS NFR BLD AUTO: 0.3 %
BILIRUB BLD-MCNC: 0.7 MG/DL (ref 0.2–1)
BUN SERPL-MCNC: 13 MG/DL (ref 6–20)
CALCIUM UR-MCNC: 8.4 MG/DL (ref 8.5–10.3)
CHLORIDE SERPL-SCNC: 102 MMOL/L (ref 101–111)
CO2 SERPL-SCNC: 28 MMOL/L (ref 21–32)
CREAT SERPLBLD-SCNC: 0.9 MG/DL (ref 0.4–1)
EOSINOPHIL # BLD AUTO: 0.1 10^3/UL (ref 0–0.7)
EOSINOPHIL NFR BLD AUTO: 1.2 %
ERYTHROCYTE [DISTWIDTH] IN BLOOD BY AUTOMATED COUNT: 13.3 % (ref 12–15)
GFRSERPLBLD MDRD-ARVRAT: 64 ML/MIN/{1.73_M2} (ref 89–?)
GLOBULIN SER-MCNC: 3.2 G/DL (ref 2.1–4.2)
GLUCOSE SERPL-MCNC: 138 MG/DL (ref 70–100)
HGB UR QL STRIP: 12.3 G/DL (ref 12–16)
LYMPHOCYTES # SPEC AUTO: 0.6 10^3/UL (ref 1.5–3.5)
LYMPHOCYTES NFR BLD AUTO: 5.3 %
MCH RBC QN AUTO: 28.3 PG (ref 27–31)
MCHC RBC AUTO-ENTMCNC: 31.6 G/DL (ref 32–36)
MCV RBC AUTO: 89.4 FL (ref 81–99)
MONOCYTES # BLD AUTO: 0.8 10^3/UL (ref 0–1)
MONOCYTES NFR BLD AUTO: 7.6 %
NEUTROPHILS # BLD AUTO: 9.3 10^3/UL (ref 1.5–6.6)
NEUTROPHILS # SNV AUTO: 10.9 X10^3/UL (ref 4.8–10.8)
NEUTROPHILS NFR BLD AUTO: 85.1 %
PDW BLD AUTO: 10.2 FL (ref 7.9–10.8)
PLATELET # BLD: 230 10^3/UL (ref 130–450)
PROT SPEC-MCNC: 6.2 G/DL (ref 6.7–8.2)
RBC MAR: 4.35 10^6/UL (ref 4.2–5.4)
SODIUM SERPLBLD-SCNC: 138 MMOL/L (ref 135–145)

## 2019-12-23 RX ADMIN — ONDANSETRON SCH MG: 4 TABLET, ORALLY DISINTEGRATING ORAL at 09:38

## 2019-12-23 RX ADMIN — SODIUM CHLORIDE, PRESERVATIVE FREE PRN ML: 5 INJECTION INTRAVENOUS at 21:48

## 2019-12-23 RX ADMIN — POTASSIUM CHLORIDE, DEXTROSE MONOHYDRATE AND SODIUM CHLORIDE SCH MLS/HR: 150; 5; 450 INJECTION, SOLUTION INTRAVENOUS at 06:21

## 2019-12-23 RX ADMIN — SODIUM CHLORIDE SCH MLS/HR: 9 INJECTION, SOLUTION INTRAVENOUS at 00:05

## 2019-12-23 RX ADMIN — ONDANSETRON SCH MG: 4 TABLET, ORALLY DISINTEGRATING ORAL at 12:05

## 2019-12-23 RX ADMIN — ACETAMINOPHEN PRN MLS/HR: 10 INJECTION, SOLUTION INTRAVENOUS at 05:41

## 2019-12-23 RX ADMIN — KETOROLAC TROMETHAMINE SCH MG: 30 INJECTION, SOLUTION INTRAMUSCULAR at 18:35

## 2019-12-23 RX ADMIN — BENZOCAINE AND MENTHOL PRN LOZENGE: 15; 3.6 LOZENGE ORAL at 01:36

## 2019-12-23 RX ADMIN — ACETAMINOPHEN PRN MLS/HR: 10 INJECTION, SOLUTION INTRAVENOUS at 15:49

## 2019-12-23 RX ADMIN — SODIUM CHLORIDE, PRESERVATIVE FREE SCH: 5 INJECTION INTRAVENOUS at 15:49

## 2019-12-23 RX ADMIN — SODIUM CHLORIDE SCH MLS/HR: 9 INJECTION, SOLUTION INTRAVENOUS at 06:09

## 2019-12-23 RX ADMIN — SODIUM CHLORIDE, PRESERVATIVE FREE SCH: 5 INJECTION INTRAVENOUS at 09:39

## 2019-12-23 RX ADMIN — SODIUM CHLORIDE SCH MLS/HR: 9 INJECTION, SOLUTION INTRAVENOUS at 12:05

## 2019-12-23 RX ADMIN — POTASSIUM CHLORIDE, DEXTROSE MONOHYDRATE AND SODIUM CHLORIDE SCH: 150; 5; 450 INJECTION, SOLUTION INTRAVENOUS at 00:59

## 2019-12-23 RX ADMIN — BENZOCAINE AND MENTHOL PRN LOZENGE: 15; 3.6 LOZENGE ORAL at 21:19

## 2019-12-23 RX ADMIN — SODIUM CHLORIDE SCH MLS/HR: 9 INJECTION, SOLUTION INTRAVENOUS at 23:04

## 2019-12-23 RX ADMIN — KETOROLAC TROMETHAMINE SCH MG: 30 INJECTION, SOLUTION INTRAMUSCULAR at 12:05

## 2019-12-23 RX ADMIN — SODIUM CHLORIDE, PRESERVATIVE FREE SCH ML: 5 INJECTION INTRAVENOUS at 01:40

## 2019-12-23 RX ADMIN — ONDANSETRON PRN MG: 2 INJECTION INTRAMUSCULAR; INTRAVENOUS at 01:40

## 2019-12-23 RX ADMIN — SODIUM CHLORIDE SCH MLS/HR: 9 INJECTION, SOLUTION INTRAVENOUS at 18:35

## 2019-12-23 RX ADMIN — SODIUM CHLORIDE SCH MG: 9 INJECTION, SOLUTION INTRAVENOUS at 06:08

## 2019-12-23 RX ADMIN — ENOXAPARIN SODIUM SCH: 100 INJECTION SUBCUTANEOUS at 09:39

## 2019-12-23 RX ADMIN — ACETAMINOPHEN PRN MLS/HR: 10 INJECTION, SOLUTION INTRAVENOUS at 22:32

## 2019-12-23 RX ADMIN — METOCLOPRAMIDE PRN MG: 5 INJECTION, SOLUTION INTRAMUSCULAR; INTRAVENOUS at 21:45

## 2019-12-23 RX ADMIN — KETOROLAC TROMETHAMINE SCH MG: 30 INJECTION, SOLUTION INTRAMUSCULAR at 06:08

## 2019-12-23 RX ADMIN — ONDANSETRON SCH: 4 TABLET, ORALLY DISINTEGRATING ORAL at 00:06

## 2019-12-23 RX ADMIN — CALCIUM CARBONATE (ANTACID) CHEW TAB 500 MG PRN MG: 500 CHEW TAB at 21:07

## 2019-12-23 RX ADMIN — CALCIUM CARBONATE (ANTACID) CHEW TAB 500 MG PRN MG: 500 CHEW TAB at 01:36

## 2019-12-23 RX ADMIN — METOCLOPRAMIDE PRN MG: 5 INJECTION, SOLUTION INTRAMUSCULAR; INTRAVENOUS at 15:50

## 2019-12-23 RX ADMIN — POTASSIUM CHLORIDE, DEXTROSE MONOHYDRATE AND SODIUM CHLORIDE SCH MLS/HR: 150; 5; 450 INJECTION, SOLUTION INTRAVENOUS at 21:07

## 2019-12-23 NOTE — PROVIDER PROGRESS NOTE
Assessment/Plan





- Problem List


(1) Acute diverticulitis


Assessment/Plan: 


Abdominal pain has resolved completely.  Diarrhea is decreasing.


Will start to advance her diet tomorrow.


Continue with IV antibiotic








(2) Nausea


Assessment/Plan: 


Her nausea finally resolved after a very large vomitus today.


She is starting get an appetite.  I will advance her to soft diet tomorrow.


Possibly discharge tomorrow if there is no more severe nausea or abdominal pain








(3) HTN (hypertension)


Assessment/Plan: 


BP is stable








- Current Meds


Current Meds: 





                               Current Medications











Generic Name Dose Route Start Last Admin





  Trade Name Freq  PRN Reason Stop Dose Admin


 


Calcium Carbonate/Glycine  500 mg  12/23/19 00:57  12/23/19 01:36





  Tums  PO   500 mg





  TID PRN   Administration





  Heartburn   





     





     





     


 


Enoxaparin Sodium  40 mg  12/20/19 09:00  12/23/19 09:39





  Lovenox  SUBQ   Not Given





  DAILY SAUD   





     





     





     





     


 


Piperacillin Sod/Tazobactam  100 mls @ 200 mls/hr  12/20/19 00:00  12/23/19 

13:14





  Sod 3.375 gm/ Sodium Chloride  IV   Infused





  Q6H SAUD   Infusion





     





     





     





     


 


Potassium Chloride/Dextrose/Sod Cl  1,000 mls @ 100 mls/hr  12/20/19 13:00  

12/23/19 06:21





  D5.45ns W/20 Meq Kcl  IV   100 mls/hr





  .Q10H SAUD   Administration





     





     





     





     


 


Metronidazole  500 mg in 100 mls @ 100 mls/hr  12/21/19 16:00  12/23/19 15:49





  Flagyl 500 Mg/100 Ml  IV   100 mls/hr





  Q8H SAUD   Administration





     





     





     





     


 


Acetaminophen  100 mls @ 400 mls/hr  12/21/19 16:00  12/23/19 16:39





  Ofirmev  IV   Infused





  Q6H PRN   Infusion





  PAIN   





     





     





     


 


Ketorolac Tromethamine  30 mg  12/22/19 18:00  12/23/19 12:05





  Toradol Inj (30mg)  IVP  12/27/19 17:59  30 mg





  Q6HR SAUD   Administration





     





     





     





     


 


Metoclopramide HCl  5 mg  12/23/19 07:05  12/23/19 15:50





  Reglan Inj  IVP   5 mg





  Q6HR PRN   Administration





  Nausea / Vomiting   





     





     





     


 


Morphine Sulfate  2 mg  12/19/19 17:50  12/21/19 12:01





  Morphine (Carpuject)  IVP   2 mg





  Q2HR PRN   Administration





  Pain 8 to 10   





     





     





     


 


Ondansetron HCl  4 mg  12/19/19 17:50  12/23/19 01:40





  Zofran Inj  IVP   4 mg





  Q6HR PRN   Administration





  Nausea / Vomiting   





     





     





     


 


Pantoprazole Sodium  40 mg  12/22/19 11:00  12/23/19 06:08





  Protonix  IVP   40 mg





  QDAC SAUD   Administration





     





     





     





     


 


Simethicone  80 mg  12/23/19 07:05  12/23/19 15:49





  Mylicon  PO   80 mg





  0900,1300,1800,2100 PRN   Administration





  Gas   





     





     





     


 


Sodium Chloride  10 ml  12/20/19 01:00  12/23/19 15:49





  Normal Saline Flush 0.9%  IVP   Not Given





  0100,0900,1700 SAUD   





     





     





     





     


 


Throat Lozenges  1 lozenge  12/23/19 00:59  12/23/19 01:36





  Cepacol  MM   1 lozenge





  Q2HR PRN   Administration





  Throat pain   





     





     





     


 


Trazodone HCl  50 mg  12/23/19 00:35  12/23/19 00:55





  Desyrel  PO   50 mg





  QPM SAUD   Administration





     





     





     





     














- Lab Result


Fish Bone Diagrams: 


                                 12/23/19 11:55





                                 12/23/19 11:55





- Additional Planning


My Orders: 





My Active Orders





12/22/19 18:00


Ketorolac Inj (30Mg) [Toradol Inj (30Mg)]   30 mg IVP Q6HR 














Subjective





- Subjective


Patient Reports: Feeling Better, Resting Comfortably, Other (Nausea resolved 

after a 500 cc vomitus this morning, She is starting to get an appetite but)





Objective


Vital Signs: 





                               Vital Signs - 24 hr











  12/22/19 12/23/19





  23:54 16:00


 


Temperature 36.7 C 37.3 C


 


Heart Rate [ 92 80





Brachial]  


 


Respiratory 16 18





Rate  


 


Blood Pressure 145/96 H 145/85 H





[Right Brachial  





artery]  


 


O2 Saturation 96 98








                                     Oxygen











O2 Source                      Room air














I&O (Last 24 Hrs): 





                          Intake and Output Totals x24h











 12/21/19 12/22/19 12/23/19





 23:59 23:59 23:59


 


Intake Total 3270 2400 1805


 


Output Total  700 


 


Balance 3270 1700 1805











General: Alert, Oriented x3


HEENT: Mucous membr. moist/pink


Neck: Supple


Neuro: Alert, Non Focal


Cardiovascular: Regular rate


Respiratory: No respiratory distress


Abdomen: Normal bowel sounds, Soft


Extremities: No edema





- Results


Results: 





                               Laboratory Results











WBC  10.9 x10^3/uL (4.8-10.8)  H  12/23/19  11:55    


 


RBC  4.35 10^6/uL (4.20-5.40)   12/23/19  11:55    


 


Hgb  12.3 g/dL (12.0-16.0)   12/23/19  11:55    


 


Hct  38.9 % (37.0-47.0)   12/23/19  11:55    


 


MCV  89.4 fL (81.0-99.0)   12/23/19  11:55    


 


MCH  28.3 pg (27.0-31.0)   12/23/19  11:55    


 


MCHC  31.6 g/dL (32.0-36.0)  L  12/23/19  11:55    


 


RDW  13.3 % (12.0-15.0)   12/23/19  11:55    


 


Plt Count  230 10^3/uL (130-450)   12/23/19  11:55    


 


MPV  10.2 fL (7.9-10.8)   12/23/19  11:55    


 


Neut # (Auto)  9.3 10^3/uL (1.5-6.6)  H  12/23/19  11:55    


 


Lymph # (Auto)  0.6 10^3/uL (1.5-3.5)  L  12/23/19  11:55    


 


Mono # (Auto)  0.8 10^3/uL (0.0-1.0)   12/23/19  11:55    


 


Eos # (Auto)  0.1 10^3/uL (0.0-0.7)   12/23/19  11:55    


 


Baso # (Auto)  0.0 10^3/uL (0.0-0.1)   12/23/19  11:55    


 


Absolute Nucleated RBC  0.00 x10^3/uL  12/23/19  11:55    


 


Nucleated RBC %  0.0 /100WBC  12/23/19  11:55    


 


Sodium  138 mmol/L (135-145)   12/23/19  11:55    


 


Potassium  3.9 mmol/L (3.5-5.0)   12/23/19  11:55    


 


Chloride  102 mmol/L (101-111)   12/23/19  11:55    


 


Carbon Dioxide  28 mmol/L (21-32)   12/23/19  11:55    


 


Anion Gap  8.0  (6-13)   12/23/19  11:55    


 


BUN  13 mg/dL (6-20)   12/23/19  11:55    


 


Creatinine  0.9 mg/dL (0.4-1.0)   12/23/19  11:55    


 


Estimated GFR (MDRD)  64  (>89)  L  12/23/19  11:55    


 


Glucose  138 mg/dL ()  H  12/23/19  11:55    


 


Calcium  8.4 mg/dL (8.5-10.3)  L  12/23/19  11:55    


 


Total Bilirubin  0.7 mg/dL (0.2-1.0)   12/23/19  11:55    


 


AST  10 IU/L (10-42)   12/23/19  11:55    


 


ALT  10 IU/L (10-60)   12/23/19  11:55    


 


Alkaline Phosphatase  72 IU/L ()   12/23/19  11:55    


 


Total Protein  6.2 g/dL (6.7-8.2)  L  12/23/19  11:55    


 


Albumin  3.0 g/dL (3.2-5.5)  L  12/23/19  11:55    


 


Globulin  3.2 g/dL (2.1-4.2)   12/23/19  11:55    


 


Albumin/Globulin Ratio  0.9  (1.0-2.2)  L  12/23/19  11:55    


 


Lipase  25 U/L (22-51)   12/19/19  13:47    


 


Urine Color  YELLOW   12/19/19  16:00    


 


Urine Clarity  CLEAR  (CLEAR)   12/19/19  16:00    


 


Urine pH  6.0 PH (5.0-7.5)   12/19/19  16:00    


 


Ur Specific Gravity  1.010  (1.002-1.030)   12/19/19  16:00    


 


Urine Protein  NEGATIVE mg/dL (NEGATIVE)   12/19/19  16:00    


 


Urine Glucose (UA)  NEGATIVE mg/dL (NEGATIVE)   12/19/19  16:00    


 


Urine Ketones  NEGATIVE mg/dL (NEGATIVE)   12/19/19  16:00    


 


Urine Occult Blood  TRACE-LYSE  (NEGATIVE)   12/19/19  16:00    


 


Urine Nitrite  NEGATIVE  (NEGATIVE)   12/19/19  16:00    


 


Urine Bilirubin  NEGATIVE  (NEGATIVE)   12/19/19  16:00    


 


Urine Urobilinogen  0.2 (NORMAL) E.U./dL (NORMAL)   12/19/19  16:00    


 


Ur Leukocyte Esterase  NEGATIVE  (NEGATIVE)   12/19/19  16:00    


 


Ur Microscopic Review  NOT INDICATED   12/19/19  16:00    


 


Urine Culture Comments  NOT INDICATED   12/19/19  16:00    














Sepsis Event Note (H)





- Evaluation


Current Stage of Sepsis: Ruled out

## 2019-12-23 NOTE — PROVIDER PROGRESS NOTE
Subjective





- General


Admit Date: 12/19/19





- Review of Systems


General: positive: Other (Feeling much better. Minimal LLQ pain.)


Gastrointestinal: positive: Nausea, Vomiting (Seeming to have difficulty w/ABX)





Objective





- Patient Data


Reviewed Vital Signs: Yes


Intake & Output: 





                          Intake and Output Totals x24h











 12/21/19 12/22/19 12/23/19





 23:59 23:59 23:59


 


Intake Total 3270 2400 1305


 


Output Total  700 


 


Balance 3270 1700 1305














- Lab Results


Lab Results: 


                                 12/22/19 04:44





                                 12/22/19 04:44





- Current Medications


Current Medications: 





                               Current Medications











Generic Name Dose Route Start Last Admin





  Trade Name Freq  PRN Reason Stop Dose Admin


 


Calcium Carbonate/Glycine  500 mg  12/23/19 00:57  12/23/19 01:36





  Tums  PO   500 mg





  TID PRN   Administration





  Heartburn   





     





     





     


 


Enoxaparin Sodium  40 mg  12/20/19 09:00  12/22/19 08:06





  Lovenox  SUBQ   Not Given





  DAILY SAUD   





     





     





     





     


 


Piperacillin Sod/Tazobactam  100 mls @ 200 mls/hr  12/20/19 00:00  12/23/19 

07:00





  Sod 3.375 gm/ Sodium Chloride  IV   Infused





  Q6H SAUD   Infusion





     





     





     





     


 


Potassium Chloride/Dextrose/Sod Cl  1,000 mls @ 100 mls/hr  12/20/19 13:00  

12/23/19 06:21





  D5.45ns W/20 Meq Kcl  IV   100 mls/hr





  .Q10H SAUD   Administration





     





     





     





     


 


Metronidazole  500 mg in 100 mls @ 100 mls/hr  12/21/19 16:00  12/23/19 01:46





  Flagyl 500 Mg/100 Ml  IV   Infused





  Q8H SAUD   Infusion





     





     





     





     


 


Acetaminophen  100 mls @ 400 mls/hr  12/21/19 16:00  12/23/19 05:59





  Ofirmev  IV   Infused





  Q6H PRN   Infusion





  PAIN   





     





     





     


 


Ketorolac Tromethamine  30 mg  12/22/19 18:00  12/23/19 06:08





  Toradol Inj (30mg)  IVP  12/27/19 17:59  30 mg





  Q6HR SAUD   Administration





     





     





     





     


 


Morphine Sulfate  2 mg  12/19/19 17:50  12/21/19 12:01





  Morphine (Carpuject)  IVP   2 mg





  Q2HR PRN   Administration





  Pain 8 to 10   





     





     





     


 


Ondansetron HCl  4 mg  12/19/19 17:50  12/23/19 01:40





  Zofran Inj  IVP   4 mg





  Q6HR PRN   Administration





  Nausea / Vomiting   





     





     





     


 


Ondansetron HCl  4 mg  12/22/19 12:00  12/23/19 00:06





  Zofran Odt  TL  12/23/19 12:00  Not Given





  Q6HR SAUD   





     





     





     





     


 


Pantoprazole Sodium  40 mg  12/22/19 11:00  12/23/19 06:08





  Protonix  IVP   40 mg





  QDAC SAUD   Administration





     





     





     





     


 


Sodium Chloride  10 ml  12/20/19 01:00  12/23/19 01:40





  Normal Saline Flush 0.9%  IVP   10 ml





  0100,0900,1700 SAUD   Administration





     





     





     





     


 


Throat Lozenges  1 lozenge  12/23/19 00:59  12/23/19 01:36





  Cepacol  MM   1 lozenge





  Q2HR PRN   Administration





  Throat pain   





     





     





     


 


Trazodone HCl  50 mg  12/23/19 00:35  12/23/19 00:55





  Desyrel  PO   50 mg





  QPM SAUD   Administration





     





     





     





     














- Physical Exam


General Appearance: positive: No acute distress


Abdomen: positive: Other (Very minimal LLQ pain)





ABX Reporting


Has patient been on IV antibiotics over the past 48 hours?: Yes





Impression/Plan





- Problem List


Problem List: 





Acute diverticulitis w/microperf resolving








Cont IV ABX 





Case discussed w/Hospitalist and we are in agreement

## 2019-12-24 VITALS — DIASTOLIC BLOOD PRESSURE: 87 MMHG | SYSTOLIC BLOOD PRESSURE: 134 MMHG

## 2019-12-24 LAB
ALBUMIN DIAFP-MCNC: 3 G/DL (ref 3.2–5.5)
ALBUMIN/GLOB SERPL: 1 {RATIO} (ref 1–2.2)
ALP SERPL-CCNC: 67 IU/L (ref 42–121)
ALT SERPL W P-5'-P-CCNC: < 10 IU/L (ref 10–60)
ANION GAP SERPL CALCULATED.4IONS-SCNC: 8 MMOL/L (ref 6–13)
AST SERPL W P-5'-P-CCNC: 11 IU/L (ref 10–42)
BASOPHILS NFR BLD AUTO: 0 10^3/UL (ref 0–0.1)
BASOPHILS NFR BLD AUTO: 0.4 %
BILIRUB BLD-MCNC: 0.4 MG/DL (ref 0.2–1)
BUN SERPL-MCNC: 16 MG/DL (ref 6–20)
CALCIUM UR-MCNC: 8.3 MG/DL (ref 8.5–10.3)
CHLORIDE SERPL-SCNC: 104 MMOL/L (ref 101–111)
CO2 SERPL-SCNC: 26 MMOL/L (ref 21–32)
CREAT SERPLBLD-SCNC: 0.7 MG/DL (ref 0.4–1)
EOSINOPHIL # BLD AUTO: 0.3 10^3/UL (ref 0–0.7)
EOSINOPHIL NFR BLD AUTO: 3.4 %
ERYTHROCYTE [DISTWIDTH] IN BLOOD BY AUTOMATED COUNT: 13.4 % (ref 12–15)
GFRSERPLBLD MDRD-ARVRAT: 85 ML/MIN/{1.73_M2} (ref 89–?)
GLOBULIN SER-MCNC: 3.1 G/DL (ref 2.1–4.2)
GLUCOSE SERPL-MCNC: 127 MG/DL (ref 70–100)
HGB UR QL STRIP: 11.8 G/DL (ref 12–16)
LYMPHOCYTES # SPEC AUTO: 0.8 10^3/UL (ref 1.5–3.5)
LYMPHOCYTES NFR BLD AUTO: 8.1 %
MCH RBC QN AUTO: 28.4 PG (ref 27–31)
MCHC RBC AUTO-ENTMCNC: 31.6 G/DL (ref 32–36)
MCV RBC AUTO: 90.1 FL (ref 81–99)
MONOCYTES # BLD AUTO: 0.8 10^3/UL (ref 0–1)
MONOCYTES NFR BLD AUTO: 7.6 %
NEUTROPHILS # BLD AUTO: 7.9 10^3/UL (ref 1.5–6.6)
NEUTROPHILS # SNV AUTO: 9.9 X10^3/UL (ref 4.8–10.8)
NEUTROPHILS NFR BLD AUTO: 80 %
PDW BLD AUTO: 10 FL (ref 7.9–10.8)
PLATELET # BLD: 242 10^3/UL (ref 130–450)
PROT SPEC-MCNC: 6.1 G/DL (ref 6.7–8.2)
RBC MAR: 4.15 10^6/UL (ref 4.2–5.4)
SODIUM SERPLBLD-SCNC: 138 MMOL/L (ref 135–145)

## 2019-12-24 RX ADMIN — KETOROLAC TROMETHAMINE SCH MG: 30 INJECTION, SOLUTION INTRAMUSCULAR at 00:10

## 2019-12-24 RX ADMIN — ENOXAPARIN SODIUM SCH MG: 100 INJECTION SUBCUTANEOUS at 08:23

## 2019-12-24 RX ADMIN — SODIUM CHLORIDE SCH MG: 9 INJECTION, SOLUTION INTRAVENOUS at 06:28

## 2019-12-24 RX ADMIN — SODIUM CHLORIDE, PRESERVATIVE FREE SCH: 5 INJECTION INTRAVENOUS at 00:10

## 2019-12-24 RX ADMIN — KETOROLAC TROMETHAMINE SCH MG: 30 INJECTION, SOLUTION INTRAMUSCULAR at 06:24

## 2019-12-24 RX ADMIN — SODIUM CHLORIDE SCH MLS/HR: 9 INJECTION, SOLUTION INTRAVENOUS at 06:23

## 2019-12-24 RX ADMIN — SODIUM CHLORIDE, PRESERVATIVE FREE SCH: 5 INJECTION INTRAVENOUS at 07:58

## 2019-12-24 RX ADMIN — SODIUM CHLORIDE, PRESERVATIVE FREE PRN ML: 5 INJECTION INTRAVENOUS at 06:28

## 2019-12-24 NOTE — DISCHARGE SUMMARY
Discharge Summary


Admit Date: 12/19/19


Discharge Date: 12/24/19


Discharging Provider: Dr Marjorie Henao


Code Status: Attempt Resuscitation


Condition at Discharge: Stable


Discharge Disposition: 01 Home, Self Care





- DIAGNOSES


Admission Diagnoses: 





(1) Acute diverticulitis





(2) Free intraperitoneal air





(3) HTN (hypertension)





(4) Obesity (BMI 35.0-39.9 without comorbidity)





Discharge Diagnoses with Status of Each Condition: 





See below








- HPI


History of Present Illness: 





From the admission H&P of William Lockhart NP:


Ms. Vargas is a 59-yrs-old female with a PMH significant for diverticulitis 

with bleeding and clipping of the bleeding vessel in 2015, who had been stable 

and on no meds currently, she presented to the ER complaining of abdominal pain.

She reported left, middle and lower abdominal pain beginning this morning. She 

denied nausea, vomiting, diarrhea, GI bleeding, fever, chill, chest pain, 

shortness of breath. CT of abdomen revealed acute diverticulitis, and small 

amounts of intraperitoneal free air. Route labs revealed slight elevated WBC of 

11.1 with left shift. She was afebrile and hemodynamical stable. The surgeon was

contacted for a consult by ER provider and the patient was admitted to the 

Hospitalist service.








- CONSULTS | PROCEDURES


Consultations: Dr Enriuqe Perry





- HOSPITAL COURSE


Hospital Course: 





(1) Acute diverticulitis


She was kept n.p.o. for bowel rest. She received iv Rocephin in the ER then was 

started on IV Flagyl and because of continued and worsening symptoms, iv Zosyn 

was added.  There was concern of perforation of the bowel because free air was 

seen in the intraperitoneum. The surgical consultant recommended repeat CT 

imaging.  This showed worsening of her colon wall thickening but no worsening of

the intraperitoneal air.  She was treated medically and did not need surgery. 

The abdominal pain finally resolved after 4 days and the diarrhea was 

decreasing. She was sent home with several more days of oral Cipro and Flagyl, 

plus Florastor probiotic, to finish her course of treatment. Was advised to 

advance her diet slowly.





(2) Nausea


Just after admission, she had diarrhea, then severe nausea with copious 

vomiting.  She may have had an ileus or been sensitive to the antibiotics being 

used.  She required antiemetics for many days before clear liquids could be 

started.  She also had relief with simethicone, needed antacids and sucralfate 

liquid, with which she was discharged.





(3) Hypokalemia


This was likely related to fluid losses from vomiting and diarrhea.  Potassium 

was replaced IV and the BMP was followed daily.





(4) HTN (hypertension)


BP was Elevated when she was in moderate to severe distress.








- ALLERGIES


Allergies/Adverse Reactions: 


                                    Allergies











Allergy/AdvReac Type Severity Reaction Status Date / Time


 


etodolac Allergy  Rash Verified 12/19/19 21:34














- MEDICATIONS


Home Medications: 


                                Ambulatory Orders











 Medication  Instructions  Recorded  Confirmed


 


Ciprofloxacin HCl [Cipro] 500 mg PO BID #10 tablet 12/24/19 


 


Metronidazole [Flagyl] 375 mg PO BID #10 capsule 12/24/19 


 


Pantoprazole [Protonix] 40 mg PO BID #60 tablet 12/24/19 


 


Saccharomyces Boulardii [Florastor] 250 mg PO BID #10 capsule 12/24/19 


 


Simethicone [Gas Relief] 80 mg PO TID PRN #12 tab.chew 12/24/19 


 


Sucralfate [Carafate] 1 gm PO TID PRN #90 ml 12/24/19 














- PHYSICAL EXAM AT DISCHARGE


General Appearance: positive: No acute distress, Alert


Eyes Bilateral: positive: Normal inspection, PERRL, EOMI


ENT: positive: ENT inspection nml


Neck: positive: Nml inspection, No JVD


Respiratory: positive: Chest non-tender, No respiratory distress


Cardiovascular: positive: Regular rate & rhythm, No murmur


Abdomen: positive: Non-tender, Nml bowel sounds


Skin: positive: Color nml


Extremities: positive: No pedal edema


Neurologic/Psychiatric: positive: Oriented x3, Other (Grossly normal)





- LABS


Result Diagrams: 


                                 12/24/19 04:40





                                 12/24/19 04:40





- DIAGNOSTIC IMAGING


Diagnostic Imaging Results: Final report reviewed





- FOLLOW UP


Follow Up: 





See PCP in the [place] area, after she returns home in 1 week from her visit 

here on [facility].








- TIME SPENT


Time Spent in Discharge (Minutes): 60

## 2019-12-24 NOTE — DISCHARGE PLAN
Discharge Plan


Problem Reviewed?: Yes


Disposition: 01 Home, Self Care


Condition: Stable


Prescriptions: 


Ciprofloxacin HCl [Cipro] 500 mg PO BID #10 tablet


Metronidazole [Flagyl] 375 mg PO BID #10 capsule


Pantoprazole [Protonix] 40 mg PO BID #60 tablet


Saccharomyces Boulardii [Florastor] 250 mg PO BID #10 capsule


Simethicone [Gas Relief] 80 mg PO TID PRN #12 tab.chew


 PRN Reason: Gas


Sucralfate [Carafate] 1 gm PO TID PRN #90 ml


 PRN Reason: Heartburn


Diet: Soft


Activity Restrictions: Activity as Tolerated


Shower Restrictions: No


Driving Restrictions: No


Instruction Topics:  Diverticulosis Diverticulitis, Diverticulitis Dc, ED 

Diverticulitis


Health Concerns: 


You were admitted with a severe case of diverticulitis.  Nausea and vomiting may

 have been from an ileus or antibiotic side effects.





You are being discharged to take several more days of antibiotics, probiotics to

 take to prevent diarrhea, anti-acid liquid and pill, and anti-gas medications. 

 The prescriptions were sent electronically to [email or web address] in [place].





Advance your diet slowly as tolerated.  The recommended diet is a bland, BRAT 

diet (bread, rice, applesauce, tea).  Stay well hydrated, using broths or 

electrolyte drinks like Gatorade or Propel.   Do not take milk products or high-

fiber foods because these are hard to digest.





See your PCP in the next 1 to 2 weeks for follow-up.





If you have new or worsening symptoms, call your PCP for advice or come to the 

ER.





Plan of Treatment: 


As above.





Care Goals: 


Improvement in symptoms and stabilization is the goal. 





Assessment: 


The patient understands and is agreeable with the plan.





No Smoking: If you smoke, Please STOP!  Call [phone] for help.